# Patient Record
Sex: FEMALE | Race: BLACK OR AFRICAN AMERICAN | NOT HISPANIC OR LATINO | Employment: FULL TIME | URBAN - METROPOLITAN AREA
[De-identification: names, ages, dates, MRNs, and addresses within clinical notes are randomized per-mention and may not be internally consistent; named-entity substitution may affect disease eponyms.]

---

## 2017-03-15 ENCOUNTER — HOSPITAL ENCOUNTER (EMERGENCY)
Facility: HOSPITAL | Age: 37
Discharge: HOME | End: 2017-03-15
Attending: EMERGENCY MEDICINE
Payer: OTHER MISCELLANEOUS

## 2017-03-15 VITALS
OXYGEN SATURATION: 98 % | BODY MASS INDEX: 39.39 KG/M2 | HEART RATE: 66 BPM | TEMPERATURE: 99 F | WEIGHT: 251 LBS | DIASTOLIC BLOOD PRESSURE: 65 MMHG | HEIGHT: 67 IN | SYSTOLIC BLOOD PRESSURE: 149 MMHG | RESPIRATION RATE: 20 BRPM

## 2017-03-15 DIAGNOSIS — R51.9 NONINTRACTABLE HEADACHE, UNSPECIFIED CHRONICITY PATTERN, UNSPECIFIED HEADACHE TYPE: Primary | ICD-10-CM

## 2017-03-15 DIAGNOSIS — S09.90XA CLOSED HEAD INJURY, INITIAL ENCOUNTER: ICD-10-CM

## 2017-03-15 PROCEDURE — 99283 EMERGENCY DEPT VISIT LOW MDM: CPT

## 2017-03-15 NOTE — ED TRIAGE NOTES
Pt presents to ED with c/o left side head pain that occurred after trying to throw away an 6 ft mirror into an outside dumpster while at work. Pt describes the pain as throbbing and shooting. Incident was on Monday. Ibuprofen OTC was taken when pain was unbearable with relief. Reported mild swelling on Monday, denies LOC.

## 2017-03-15 NOTE — ED AVS SNAPSHOT
[facility]  [address] [address]               Rob Hernández   3/15/2017  2:35 PM   ED    Description:  Female : 1980   Department:  [facility]           Your Care was Coordinated By:     Provider Role From To    Ravi Anderson MD Attending Provider 03/15/17 9219 --      Reason for Visit     Headache           Diagnoses this Visit        Comments    Nonintractable headache, unspecified chronicity pattern, unspecified headache type    -  Primary     Closed head injury, initial encounter           ED Disposition     None           To Do List           Follow-up Information     Follow up with Courtney Osuna MD. Schedule an appointment as soon as possible for a visit in 1 week.    Specialty:  Internal Medicine    Contact information:    [address] [facility]  [address]  [facility] [address]  [phone]          Schedule an appointment as soon as possible for a visit with Fernandez Herman III, MD.    Specialty:  Neurology    Why:  If symptoms worsen    Contact information:    [address]  [address]  [facility] [address]  [phone]          Follow up with [facility].    Specialty:  Emergency Medicine    Why:  If symptoms worsen    Contact information:    [address] [place] [address] [phone]      [facility]     [facility] Care [facility] - 24/7 Assistance  Registered nurses in the [facility] provide clinical advisement, health education, appointment booking, and other advisory services.  Call for this free service at [phone].             Medications           Message regarding Medications     Verify the changes and/or additions to your medication regime listed below are the same as discussed with your clinician today.  If any of these changes or additions are incorrect, please notify your healthcare provider.             Verify that the below list of  "medications is an accurate representation of the medications you are currently taking.  If none reported, the list may be blank. If incorrect, please contact your healthcare provider. Carry this list with you in case of emergency.           Current Medications     hydrocodone-acetaminophen 5-325mg (NORCO) 5-325 mg per tablet Take 1 tablet by mouth every 6 (six) hours as needed for Pain.    ibuprofen (ADVIL,MOTRIN) 800 MG tablet Take 1 tablet (800 mg total) by mouth 3 (three) times daily.           Clinical Reference Information           Your Vitals Were     BP Pulse Temp Resp Height Weight    149/65 (Patient Position: Sitting) 66 98.6 °F (37 °C) (Oral) 20 5' 7" (1.702 m) 113.9 kg (251 lb)    Last Period SpO2 BMI          03/15/2017 (Exact Date) 98% 39.31 kg/m2        Allergies as of 3/15/2017     No Known Allergies      Immunizations Administered on Date of Encounter - 3/15/2017     None      ED Micro, Lab, POCT     Start Ordered       Status Ordering Provider    03/15/17 1411 03/15/17 1410  POCT urine pregnancy  Once      Ordered       ED Imaging Orders     None      Discharge References/Attachments     CONCUSSION, DISCHARGE INSTRUCTIONS FOR (ENGLISH)    HEAD INJURY (ADULT) (ENGLISH)       [facility] complies with applicable Federal civil rights laws and does not discriminate on the basis of race, color, national origin, age, disability, or sex.        Language Assistance Services     ATTENTION: Language assistance services are available, free of charge. Please call [phone].      ATENCIÓN: Si habla español, tiene a ya disposición servicios gratuitos de asistencia lingüística. Llame al [phone].     CHÚ Ý: N?u b?n nói Ti?ng Vi?t, có các d?ch v? h? tr? ngôn ng? mi?n phí dành cho b?n. G?i s? [phone].        "

## 2017-09-05 NOTE — ED PROVIDER NOTES
"Encounter Date: 3/15/2017       History     Chief Complaint   Patient presents with    Headache     headaches; "large mirror hit me in head last week at work;" intermittent sharp pain to left side of forehead where mirror hit her     Review of patient's allergies indicates:  No Known Allergies  HPI Comments: Patient presents complaining of intermittent headaches ever since a closed head injury last week.  Patient reports she was putting away a large near when it fell backwards and struck her on the left side of her head.  No loss of consciousness.  No nausea or vomiting.  Has had intermittent headache since then.  Usually resolve with ibuprofen.  Some photophobia.  No photophobia.  No dizziness lightheadedness.  No difficulty talking.  No difficulty walking.  Never any bleeding or bruising.  Denies any other complaints.  Denies any blood thinning medication.    The history is provided by the patient.     History reviewed. No pertinent past medical history.  Past Surgical History:   Procedure Laterality Date    INNER EAR SURGERY      INNER EAR SURGERY      left knee surg      right shoulder surg      TUBAL LIGATION      VAGINAL DELIVERY      x3     Family History   Problem Relation Age of Onset    Hypertension Mother      Social History   Substance Use Topics    Smoking status: Never Smoker    Smokeless tobacco: Never Used    Alcohol use No     Review of Systems   Constitutional: Negative for activity change, appetite change, chills, fatigue and fever.   HENT: Negative for congestion, drooling, ear pain, postnasal drip, rhinorrhea, sinus pressure, sneezing and sore throat.    Eyes: Positive for photophobia. Negative for discharge and redness.   Respiratory: Negative for cough, chest tightness, shortness of breath and wheezing.    Cardiovascular: Negative for chest pain.   Gastrointestinal: Negative for abdominal pain, diarrhea, nausea and rectal pain.   Endocrine: Negative for polydipsia, polyphagia and " polyuria.   Genitourinary: Negative for decreased urine volume, dysuria, flank pain, pelvic pain and urgency.   Musculoskeletal: Negative for back pain, myalgias and neck pain.   Skin: Negative for rash.   Neurological: Positive for headaches. Negative for dizziness, tremors, syncope, facial asymmetry, speech difficulty, weakness and numbness.   Hematological: Does not bruise/bleed easily.   Psychiatric/Behavioral: Negative for confusion and dysphoric mood.   All other systems reviewed and are negative.      Physical Exam   Initial Vitals   BP Pulse Resp Temp SpO2   03/15/17 1407 03/15/17 1407 03/15/17 1407 03/15/17 1407 03/15/17 1407   149/65 66 20 98.6 °F (37 °C) 98 %     Physical Exam    Nursing note and vitals reviewed.  Constitutional: She appears well-developed and well-nourished. She is not diaphoretic. No distress.   HENT:   Head: Normocephalic and atraumatic.   Right Ear: External ear normal.   Left Ear: External ear normal.   Nose: Nose normal.   Mouth/Throat: Oropharynx is clear and moist.   Eyes: Conjunctivae and EOM are normal. Pupils are equal, round, and reactive to light. Right eye exhibits no discharge. Left eye exhibits no discharge. No scleral icterus.   Neck: Normal range of motion. Neck supple.   Cardiovascular: Normal rate, regular rhythm, normal heart sounds and intact distal pulses.   No murmur heard.  Pulmonary/Chest: Breath sounds normal. No respiratory distress. She has no wheezes. She has no rhonchi. She has no rales. She exhibits no tenderness.   Abdominal: Soft. Bowel sounds are normal. She exhibits no distension. There is no tenderness. There is no rebound and no guarding.   Musculoskeletal: Normal range of motion. She exhibits no edema or tenderness.   Neurological: She is alert and oriented to person, place, and time. She has normal strength.   Cranial nerves II through XII grossly intact.  5/5 motor strength all 4 extremities.  Sensation is normal.  Finger to nose normal.  Gait  normal.  Speech and cognition is normal.  No focal neurologic deficit.     Skin: Skin is warm and dry.   Psychiatric: She has a normal mood and affect. Her behavior is normal. Judgment and thought content normal.         ED Course   Procedures  Labs Reviewed   POCT URINE PREGNANCY             Medical Decision Making:   ED Management:  Well-appearing patient with intermittent headaches ever since a closed head injury last week.  No concerning findings physical examination.  Completely normal neurologic exam.  No signs of skull fracture.  No hemotympanum.  No nausea vomiting.  No indication for neuroimaging.  Discussed closed head injuries and possible concussion.  Signed return to work form.    I did have an extensive talk regarding signs to return for and need for follow up. Patient expressed understanding and will monitor symptoms closely and follow-up as needed.    LAUREL Anderson M.D.  03/15/2017  2:48 PM                     ED Course     Clinical Impression:   The primary encounter diagnosis was Nonintractable headache, unspecified chronicity pattern, unspecified headache type. A diagnosis of Closed head injury, initial encounter was also pertinent to this visit.          Ravi Anderson MD  03/15/17 1381     ACS ATTENDING AND PA family at bedside. Justo AMAYA

## 2017-09-23 ENCOUNTER — HOSPITAL ENCOUNTER (EMERGENCY)
Facility: HOSPITAL | Age: 37
Discharge: HOME | End: 2017-09-23
Attending: EMERGENCY MEDICINE
Payer: MEDICAID

## 2017-09-23 VITALS
RESPIRATION RATE: 16 BRPM | WEIGHT: 237 LBS | SYSTOLIC BLOOD PRESSURE: 126 MMHG | HEIGHT: 67 IN | OXYGEN SATURATION: 100 % | HEART RATE: 66 BPM | BODY MASS INDEX: 37.2 KG/M2 | TEMPERATURE: 99 F | DIASTOLIC BLOOD PRESSURE: 72 MMHG

## 2017-09-23 DIAGNOSIS — E86.0 DEHYDRATION: ICD-10-CM

## 2017-09-23 DIAGNOSIS — K52.9 GASTROENTERITIS: Primary | ICD-10-CM

## 2017-09-23 LAB
ALBUMIN SERPL BCP-MCNC: 3.8 G/DL
ALP SERPL-CCNC: 73 U/L
ALT SERPL W/O P-5'-P-CCNC: 19 U/L
ANION GAP SERPL CALC-SCNC: 9 MMOL/L
AST SERPL-CCNC: 23 U/L
B-HCG UR QL: NEGATIVE
BACTERIA #/AREA URNS HPF: ABNORMAL /HPF
BASOPHILS # BLD AUTO: 0.01 K/UL
BASOPHILS NFR BLD: 0.1 %
BILIRUB SERPL-MCNC: 0.6 MG/DL
BILIRUB UR QL STRIP: NEGATIVE
BUN SERPL-MCNC: 9 MG/DL
CALCIUM SERPL-MCNC: 9.2 MG/DL
CHLORIDE SERPL-SCNC: 105 MMOL/L
CLARITY UR: CLEAR
CO2 SERPL-SCNC: 25 MMOL/L
COLOR UR: YELLOW
CREAT SERPL-MCNC: 0.8 MG/DL
CTP QC/QA: YES
DIFFERENTIAL METHOD: ABNORMAL
EOSINOPHIL # BLD AUTO: 0 K/UL
EOSINOPHIL NFR BLD: 0 %
ERYTHROCYTE [DISTWIDTH] IN BLOOD BY AUTOMATED COUNT: 13.8 %
EST. GFR  (AFRICAN AMERICAN): >60 ML/MIN/1.73 M^2
EST. GFR  (NON AFRICAN AMERICAN): >60 ML/MIN/1.73 M^2
GLUCOSE SERPL-MCNC: 110 MG/DL
GLUCOSE UR QL STRIP: NEGATIVE
HCT VFR BLD AUTO: 39.2 %
HGB BLD-MCNC: 13 G/DL
HGB UR QL STRIP: ABNORMAL
KETONES UR QL STRIP: NEGATIVE
LEUKOCYTE ESTERASE UR QL STRIP: NEGATIVE
LYMPHOCYTES # BLD AUTO: 0.5 K/UL
LYMPHOCYTES NFR BLD: 6.3 %
MCH RBC QN AUTO: 28.9 PG
MCHC RBC AUTO-ENTMCNC: 33.2 G/DL
MCV RBC AUTO: 87 FL
MICROSCOPIC COMMENT: ABNORMAL
MONOCYTES # BLD AUTO: 0.2 K/UL
MONOCYTES NFR BLD: 2.7 %
NEUTROPHILS # BLD AUTO: 7.8 K/UL
NEUTROPHILS NFR BLD: 90.9 %
NITRITE UR QL STRIP: NEGATIVE
PH UR STRIP: 5 [PH] (ref 5–8)
PLATELET # BLD AUTO: 231 K/UL
PMV BLD AUTO: 10 FL
POTASSIUM SERPL-SCNC: 3.7 MMOL/L
PROT SERPL-MCNC: 7.5 G/DL
PROT UR QL STRIP: NEGATIVE
RBC # BLD AUTO: 4.5 M/UL
RBC #/AREA URNS HPF: 12 /HPF (ref 0–4)
SODIUM SERPL-SCNC: 139 MMOL/L
SP GR UR STRIP: 1.02 (ref 1–1.03)
SQUAMOUS #/AREA URNS HPF: 3 /HPF
URN SPEC COLLECT METH UR: ABNORMAL
UROBILINOGEN UR STRIP-ACNC: NEGATIVE EU/DL
WBC # BLD AUTO: 8.57 K/UL

## 2017-09-23 PROCEDURE — 85025 COMPLETE CBC W/AUTO DIFF WBC: CPT

## 2017-09-23 PROCEDURE — 63600175 PHARM REV CODE 636 W HCPCS: Performed by: NURSE PRACTITIONER

## 2017-09-23 PROCEDURE — 25000003 PHARM REV CODE 250: Performed by: NURSE PRACTITIONER

## 2017-09-23 PROCEDURE — 96374 THER/PROPH/DIAG INJ IV PUSH: CPT

## 2017-09-23 PROCEDURE — 80053 COMPREHEN METABOLIC PANEL: CPT

## 2017-09-23 PROCEDURE — 81000 URINALYSIS NONAUTO W/SCOPE: CPT

## 2017-09-23 PROCEDURE — 81025 URINE PREGNANCY TEST: CPT

## 2017-09-23 PROCEDURE — 96361 HYDRATE IV INFUSION ADD-ON: CPT

## 2017-09-23 PROCEDURE — 99283 EMERGENCY DEPT VISIT LOW MDM: CPT | Mod: 25

## 2017-09-23 RX ORDER — DICYCLOMINE HYDROCHLORIDE 10 MG/1
20 CAPSULE ORAL
Status: COMPLETED | OUTPATIENT
Start: 2017-09-23 | End: 2017-09-23

## 2017-09-23 RX ORDER — ONDANSETRON 2 MG/ML
4 INJECTION INTRAMUSCULAR; INTRAVENOUS
Status: COMPLETED | OUTPATIENT
Start: 2017-09-23 | End: 2017-09-23

## 2017-09-23 RX ORDER — ONDANSETRON 4 MG/1
4 TABLET, ORALLY DISINTEGRATING ORAL EVERY 8 HOURS PRN
Qty: 12 TABLET | Refills: 0 | Status: SHIPPED | OUTPATIENT
Start: 2017-09-23 | End: 2023-11-01 | Stop reason: CLARIF

## 2017-09-23 RX ORDER — DICYCLOMINE HYDROCHLORIDE 20 MG/1
20 TABLET ORAL 2 TIMES DAILY
Qty: 20 TABLET | Refills: 0 | Status: SHIPPED | OUTPATIENT
Start: 2017-09-23 | End: 2017-10-23

## 2017-09-23 RX ADMIN — DICYCLOMINE HYDROCHLORIDE 20 MG: 10 CAPSULE ORAL at 09:09

## 2017-09-23 RX ADMIN — ONDANSETRON 4 MG: 2 INJECTION INTRAMUSCULAR; INTRAVENOUS at 09:09

## 2017-09-23 RX ADMIN — SODIUM CHLORIDE 1000 ML: 0.9 INJECTION, SOLUTION INTRAVENOUS at 09:09

## 2017-09-24 NOTE — ED TRIAGE NOTES
Patient c/o vomiting, bodyaches, chills and diarrhea since this morning. Patient states she has abdominal pain. Patient denies fever, vaginal discharge and dysuria.

## 2017-09-24 NOTE — ED PROVIDER NOTES
Encounter Date: 9/23/2017    SCRIBE #1 NOTE: I, Kymberly Simon, am scribing for, and in the presence of,  Leonie Johnson NP. I have scribed the following portions of the note - Other sections scribed: HPI and ROS.       History     Chief Complaint   Patient presents with    Vomiting     Patient states that she woke up this morning vomiting and that she has not stopped since.     Abdominal Pain     CC: Vomiting and Abdominal Pain    HPI: The pt is a 37 y.o. F with a PMHx of torn meniscus and torn ligament who presents to the ED c/o nausea with vomiting, sore throat, and acute onset, constant abdominal pain that started this morning. Pt rates pain as severe (8/10). Pt says that she ate a salad yesterday and thinks that it might have caused symptoms. Pt says that she experienced 2 episodes of diarrhea today. No attempted treatments. No alleviating factors. Pt otherwise denies fever, dysuria, and other associated symptoms.       The history is provided by the patient. No  was used.     Review of patient's allergies indicates:  No Known Allergies  Past Medical History:   Diagnosis Date    Torn ligament     left shoulder    Torn meniscus      Past Surgical History:   Procedure Laterality Date    INNER EAR SURGERY      INNER EAR SURGERY      left knee surg      right shoulder surg      TUBAL LIGATION      VAGINAL DELIVERY      x3     Family History   Problem Relation Age of Onset    Hypertension Mother      Social History   Substance Use Topics    Smoking status: Never Smoker    Smokeless tobacco: Never Used    Alcohol use Yes      Comment: occ     Review of Systems   Constitutional: Negative for chills, diaphoresis and fever.   HENT: Positive for sore throat. Negative for ear pain.    Eyes: Negative for redness.   Respiratory: Negative for cough and shortness of breath.    Cardiovascular: Negative for chest pain.   Gastrointestinal: Positive for abdominal pain, diarrhea, nausea and vomiting.    Genitourinary: Negative for dysuria.   Musculoskeletal: Negative for back pain.   Skin: Negative for rash.   Neurological: Negative for headaches.       Physical Exam     Initial Vitals [09/23/17 1940]   BP Pulse Resp Temp SpO2   130/62 65 16 98.9 °F (37.2 °C) 98 %      MAP       84.67         Physical Exam    Nursing note and vitals reviewed.  Constitutional: She appears well-developed and well-nourished.   HENT:   Head: Normocephalic.   Somewhat dry mucous membranes.   Eyes: Conjunctivae are normal.   Neck: Normal range of motion. Neck supple.   Cardiovascular: Normal rate, regular rhythm and normal heart sounds.   Pulmonary/Chest: Breath sounds normal.   Abdominal: Soft. There is no tenderness.   Hyperactive bowel sounds.   Musculoskeletal: Normal range of motion.   Neurological: She is alert and oriented to person, place, and time.   Skin: Skin is warm and dry. Capillary refill takes less than 2 seconds.         ED Course   Procedures  Labs Reviewed   URINALYSIS - Abnormal; Notable for the following:        Result Value    Occult Blood UA 3+ (*)     All other components within normal limits   URINALYSIS MICROSCOPIC - Abnormal; Notable for the following:     RBC, UA 12 (*)     All other components within normal limits   CBC W/ AUTO DIFFERENTIAL - Abnormal; Notable for the following:     Gran # 7.8 (*)     Lymph # 0.5 (*)     Mono # 0.2 (*)     Gran% 90.9 (*)     Lymph% 6.3 (*)     Mono% 2.7 (*)     All other components within normal limits   COMPREHENSIVE METABOLIC PANEL   POCT URINE PREGNANCY             Medical Decision Making:   Differential Diagnosis:   Gastroenteritis  Dehydration  Appendicitis  ED Management:  Diagnosis management comments: This is an urgent evaluation of a 37-year-old female that presented to the ER with c/o vomiting, diarrhea, and abdominal pain. Pts exam was as above.     Labs were reviewed and discussed with pt.     Liter of normal saline given along with Zofran and Bentyl.  Patient  states feeling much better after fluids and she will be discharged with Bentyl and Zofran prescriptions.  We've discussed aggressive hydration and symptomatic treatment at home.    Based on exam today - I have low suspicion for medical, surgical or other life threatening condition and I believe pt is safe for discharge and outpatient f/u.    Pt verbalizes understanding of d/c instructions and will return for worsening condition.    Case discussed with attending who agrees with assessment and plan.               Scribe Attestation:   Scribe #1: I performed the above scribed service and the documentation accurately describes the services I performed. I attest to the accuracy of the note.    Attending Attestation:           Physician Attestation for Scribe:  Physician Attestation Statement for Scribe #1: I, Leonie Johnson NP, reviewed documentation, as scribed by Kymberly Simon in my presence, and it is both accurate and complete.                 ED Course      Clinical Impression:   The primary encounter diagnosis was Gastroenteritis. A diagnosis of Dehydration was also pertinent to this visit.    Disposition:   Disposition: Discharged  Condition: Stable                        Leonie Johnson NP  09/24/17 0254

## 2018-03-10 ENCOUNTER — HOSPITAL ENCOUNTER (EMERGENCY)
Facility: HOSPITAL | Age: 38
Discharge: HOME | End: 2018-03-10
Attending: EMERGENCY MEDICINE
Payer: MEDICAID

## 2018-03-10 VITALS
BODY MASS INDEX: 33.9 KG/M2 | HEIGHT: 67 IN | HEART RATE: 68 BPM | RESPIRATION RATE: 16 BRPM | WEIGHT: 216 LBS | SYSTOLIC BLOOD PRESSURE: 122 MMHG | DIASTOLIC BLOOD PRESSURE: 57 MMHG | TEMPERATURE: 98 F | OXYGEN SATURATION: 98 %

## 2018-03-10 DIAGNOSIS — M79.605 LEG PAIN, ANTERIOR, LEFT: Primary | ICD-10-CM

## 2018-03-10 PROCEDURE — 99283 EMERGENCY DEPT VISIT LOW MDM: CPT

## 2018-03-10 RX ORDER — IBUPROFEN 800 MG/1
800 TABLET ORAL EVERY 8 HOURS PRN
Qty: 20 TABLET | Refills: 0 | Status: SHIPPED | OUTPATIENT
Start: 2018-03-10 | End: 2023-11-01 | Stop reason: CLARIF

## 2018-03-10 NOTE — ED PROVIDER NOTES
Encounter Date: 3/10/2018    SCRIBE #1 NOTE: I, Ronna Ricci, am scribing for, and in the presence of,  Nina Magana MD. I have scribed the following portions of the note - Other sections scribed: HPI, ROS, and PE.       History     Chief Complaint   Patient presents with    Leg Pain     Pt report after standing up 3 days ago got a sharp pain in left great toe radiating up to hip area, denies injury, pain is getting worse.      CC: Leg Pain    HPI: This 37 y.o female presents to the ED for an evaluation of acute onset, constant pain to her left anterior lower leg radiating to the dorsum of her left foot for the past 3 days.  Patient reports the pain initially began after getting up from a seated position after sitting in a small chair approximately 1 foot off the ground.  Patient denies any recent falls, trauma, or injuries.  Patient reports she initially assumed the pain was a result of her prior h/o a torn meniscus to her left knee or due to the impact of her running during her workouts. Patient reports since she has been wearing a knee brace.  Patient reports attempting treatment with IcyHot, Naproxen, and massaging the area.  She currently reports of no pain at this time.  Patient denies fever, chills, extremity numbness, extremity weakness, rash, hip pain, redness, swelling, calf pain, or any other associated symptoms.        The history is provided by the patient. No  was used.     Review of patient's allergies indicates:  No Known Allergies  Past Medical History:   Diagnosis Date    Torn ligament     left shoulder    Torn meniscus      Past Surgical History:   Procedure Laterality Date    INNER EAR SURGERY      INNER EAR SURGERY      left knee surg      right shoulder surg      TUBAL LIGATION      VAGINAL DELIVERY      x3     Family History   Problem Relation Age of Onset    Hypertension Mother      Social History   Substance Use Topics    Smoking status: Never Smoker     Smokeless tobacco: Never Used    Alcohol use Yes      Comment: occ     Review of Systems   Constitutional: Negative for chills and fever.   HENT: Negative for ear pain and sore throat.    Eyes: Negative for pain.   Respiratory: Negative for cough and shortness of breath.    Cardiovascular: Negative for chest pain.   Gastrointestinal: Negative for abdominal pain, diarrhea, nausea and vomiting.   Genitourinary: Negative for dysuria.   Musculoskeletal: Positive for myalgias. Negative for back pain.        Left anterior leg pain.   Skin: Negative for rash.   Neurological: Negative for weakness, numbness and headaches.   All other systems reviewed and are negative.      Physical Exam     Initial Vitals [03/10/18 0258]   BP Pulse Resp Temp SpO2   112/63 81 16 98.1 °F (36.7 °C) 96 %      MAP       79.33         Physical Exam    Nursing note and vitals reviewed.  Constitutional: She appears well-developed and well-nourished. She is not diaphoretic. No distress.   HENT:   Head: Normocephalic and atraumatic.   Eyes: Conjunctivae and EOM are normal. Pupils are equal, round, and reactive to light.   Neck: Normal range of motion. Neck supple.   Cardiovascular: Normal rate and regular rhythm.   Pulses:       Dorsalis pedis pulses are 2+ on the left side.        Posterior tibial pulses are 2+ on the left side.   Pulmonary/Chest: Breath sounds normal. No respiratory distress. She has no wheezes. She has no rhonchi. She has no rales. She exhibits no tenderness.   Abdominal: Soft. Normal appearance and bowel sounds are normal. She exhibits no distension. There is no tenderness. There is no rebound and no guarding.   Musculoskeletal: Normal range of motion. She exhibits no edema.   Patient has no tenderness with palpation to the left posterior knee or left calf.   Neurological: She is alert and oriented to person, place, and time. She has normal strength and normal reflexes.   Skin: Skin is warm and dry. Capillary refill takes less  than 2 seconds. No rash noted. No erythema.   Psychiatric: She has a normal mood and affect.         ED Course   Procedures  Labs Reviewed - No data to display          Medical Decision Making:   History:   Old Medical Records: I decided to obtain old medical records.  Initial Assessment:   37 y.o female presents to the ED for an evaluation of acute onset, constant pain to her left anterior lower leg radiating to the dorsum of her left foot for the past 3 days.  Patient reports the pain initially began after getting up from a seated position after sitting in a small chair approximately 1 foot off the ground.  Patient denies any recent falls, trauma, or injuries. On exam, the patient has normal ROM without pain.  There is no evidence of swelling, warmth, or erythema.  There is no calf or posterior knee tenderness.  Patient has a normal dorsalis pedis pulse.  Differential Diagnosis:   Leg Pain, Leg Strain            Scribe Attestation:   Scribe #1: I performed the above scribed service and the documentation accurately describes the services I performed. I attest to the accuracy of the note.    Attending Attestation:           Physician Attestation for Scribe:  Physician Attestation Statement for Scribe #1: I, Nina Magana MD, reviewed documentation, as scribed by Ronna Ricci in my presence, and it is both accurate and complete.     Comments: I, Dr. Magana, personally performed the services described in this documentation. All medical record entries made by the scribe were at my direction and in my presence.  I have reviewed the chart and agree that the record reflects my personal performance and is accurate and complete.                 Clinical Impression:   The encounter diagnosis was Leg pain, anterior, left.    Disposition:   Disposition: Discharged  Condition: Stable                        Nina Magana MD  03/10/18 0546

## 2018-03-10 NOTE — ED TRIAGE NOTES
"Pt c/o left hip pain radiating down to toes x3 days. Described as "needle like"  with numbness and tingling. Pt states that pain currently has subsided since. Denies injuries or trauma. Pt states p[ain began today when patient was stretching. Pt states her left legs has started to swell the past 3 days. Pt states pain was "shooting" behind calf area   "

## 2018-06-11 ENCOUNTER — OFFICE VISIT (OUTPATIENT)
Dept: OBSTETRICS AND GYNECOLOGY | Facility: CLINIC | Age: 38
End: 2018-06-11
Payer: MEDICAID

## 2018-06-11 VITALS
SYSTOLIC BLOOD PRESSURE: 116 MMHG | WEIGHT: 202 LBS | BODY MASS INDEX: 31.71 KG/M2 | HEIGHT: 67 IN | DIASTOLIC BLOOD PRESSURE: 68 MMHG

## 2018-06-11 DIAGNOSIS — Z11.3 SCREEN FOR STD (SEXUALLY TRANSMITTED DISEASE): ICD-10-CM

## 2018-06-11 DIAGNOSIS — Z01.419 GYNECOLOGIC EXAM NORMAL: Primary | ICD-10-CM

## 2018-06-11 DIAGNOSIS — N89.8 VAGINAL DISCHARGE: ICD-10-CM

## 2018-06-11 PROCEDURE — 99999 PR PBB SHADOW E&M-EST. PATIENT-LVL III: CPT | Mod: PBBFAC,,, | Performed by: OBSTETRICS & GYNECOLOGY

## 2018-06-11 PROCEDURE — 87491 CHLMYD TRACH DNA AMP PROBE: CPT

## 2018-06-11 PROCEDURE — 87480 CANDIDA DNA DIR PROBE: CPT

## 2018-06-11 PROCEDURE — 99213 OFFICE O/P EST LOW 20 MIN: CPT | Mod: PBBFAC | Performed by: OBSTETRICS & GYNECOLOGY

## 2018-06-11 PROCEDURE — 99386 PREV VISIT NEW AGE 40-64: CPT | Mod: S$PBB,,, | Performed by: OBSTETRICS & GYNECOLOGY

## 2018-06-11 PROCEDURE — 87510 GARDNER VAG DNA DIR PROBE: CPT

## 2018-06-11 PROCEDURE — 88175 CYTOPATH C/V AUTO FLUID REDO: CPT

## 2018-06-11 RX ORDER — DEXAMETHASONE 0.75 MG/1
TABLET ORAL
Refills: 1 | COMMUNITY
Start: 2018-03-30 | End: 2023-11-01 | Stop reason: CLARIF

## 2018-06-11 RX ORDER — OMEPRAZOLE 40 MG/1
CAPSULE, DELAYED RELEASE ORAL
Refills: 1 | COMMUNITY
Start: 2018-04-09 | End: 2023-11-01 | Stop reason: CLARIF

## 2018-06-11 NOTE — PROGRESS NOTES
Subjective:       Patient ID: Rob Hernández is a 38 y.o. female.    Chief Complaint:  Gynecologic Exam      History of Present Illness  HPI  Annual Exam-Premenopausal  Patient presents for annual exam. The patient has no complaints today. The patient is sexually active. GYN screening history: last pap: was normal and patient does not recall when last pap was.  Pt just had [facility] at [facility]. The patient wears seatbelts: yes. The patient participates in regular exercise: no. Has the patient ever been transfused or tattooed?: not asked. The patient reports that there is not domestic violence in her life.    Pt c/o vaginal discharge with odor for approx 1 month.                  GYN & OB History  Patient's last menstrual period was 2018.   Date of Last Pap: No result found    OB History    Para Term  AB Living   4 4 4     4   SAB TAB Ectopic Multiple Live Births           4      # Outcome Date GA Lbr Carlitos/2nd Weight Sex Delivery Anes PTL Lv   4 Term 10/12/11 40w0d   F Vag-Spont  N AURY   3 Term 06 40w0d   M Vag-Spont  N AURY   2 Term 04 40w0d    Vag-Spont  N AURY   1 Term 00 40w0d   M Vag-Spont  N AURY          Review of Systems  Review of Systems   Constitutional: Negative.    Respiratory: Negative.    Cardiovascular: Negative.    Gastrointestinal: Negative.    Endocrine: Negative.    Genitourinary: Negative.    Musculoskeletal: Negative.    Hematological: Negative.    Psychiatric/Behavioral: Negative.    Breast: negative.            Objective:    Physical Exam:   Constitutional: She is oriented to person, place, and time. She appears well-developed and well-nourished. No distress.    HENT:   Head: Normocephalic and atraumatic.     Neck: Normal range of motion. No thyromegaly present.    Cardiovascular: Normal rate.     Pulmonary/Chest: Effort normal. No respiratory distress.        Abdominal: Soft. She exhibits no distension and no mass. There is no tenderness. There is no  rebound and no guarding.     Genitourinary: Uterus normal. Pelvic exam was performed with patient supine. There is no rash, tenderness, lesion or injury on the right labia. There is no rash, tenderness, lesion or injury on the left labia. Cervix is normal. Right adnexum displays no mass, no tenderness and no fullness. Left adnexum displays no mass, no tenderness and no fullness. No erythema, tenderness, bleeding, rectocele, cystocele or unspecified prolapse of vaginal walls in the vagina. No foreign body in the vagina. No signs of injury around the vagina. Vaginal discharge found. Labial bartholins normal.          Musculoskeletal: Normal range of motion and moves all extremeties.       Neurological: She is alert and oriented to person, place, and time. No cranial nerve deficit. Coordination normal.    Skin: She is not diaphoretic.    Psychiatric: She has a normal mood and affect. Her behavior is normal. Judgment and thought content normal.          Assessment:        1. Gynecologic exam normal    2. Screen for STD (sexually transmitted disease)    3. Vaginal discharge               Plan:      1.  Pap, GC/CT andAFFIRM collected - treatment based on results  2.  Pt up to date with MMG

## 2018-06-12 LAB
CANDIDA RRNA VAG QL PROBE: NEGATIVE
G VAGINALIS RRNA GENITAL QL PROBE: POSITIVE
T VAGINALIS RRNA GENITAL QL PROBE: NEGATIVE

## 2018-06-13 ENCOUNTER — TELEPHONE (OUTPATIENT)
Dept: OBSTETRICS AND GYNECOLOGY | Facility: HOSPITAL | Age: 38
End: 2018-06-13

## 2018-06-13 DIAGNOSIS — N76.0 BACTERIAL VAGINOSIS: Primary | ICD-10-CM

## 2018-06-13 DIAGNOSIS — B96.89 BACTERIAL VAGINOSIS: Primary | ICD-10-CM

## 2018-06-13 RX ORDER — METRONIDAZOLE 500 MG/1
500 TABLET ORAL EVERY 12 HOURS
Qty: 14 TABLET | Refills: 0 | Status: SHIPPED | OUTPATIENT
Start: 2018-06-13 | End: 2018-06-27

## 2018-06-14 LAB
C TRACH DNA SPEC QL NAA+PROBE: NOT DETECTED
N GONORRHOEA DNA SPEC QL NAA+PROBE: NOT DETECTED

## 2018-06-15 ENCOUNTER — TELEPHONE (OUTPATIENT)
Dept: OBSTETRICS AND GYNECOLOGY | Facility: CLINIC | Age: 38
End: 2018-06-15

## 2018-06-15 NOTE — TELEPHONE ENCOUNTER
Returned patient's call. Requested results of pap. Informed her that pap results are not in yet. Results given for neg. Cultures. But pt does have bv. Aware that prescription has been sent to pharmacy for . Pt acknowledged. CW

## 2018-06-15 NOTE — TELEPHONE ENCOUNTER
----- Message from Sudarshan Guzmán sent at 6/15/2018 12:00 PM CDT -----  Contact: Rob [phone]  Patient is calling to get the results of her last pap smear. Please call at your earliest convenience.

## 2018-07-03 DIAGNOSIS — M25.561 RIGHT KNEE PAIN: Primary | ICD-10-CM

## 2018-07-03 DIAGNOSIS — R22.41 LOCALIZED SWELLING, MASS AND LUMP, LOWER LIMB, RIGHT: ICD-10-CM

## 2018-07-16 ENCOUNTER — HOSPITAL ENCOUNTER (OUTPATIENT)
Dept: RADIOLOGY | Facility: HOSPITAL | Age: 38
Discharge: HOME | End: 2018-07-16
Attending: INTERNAL MEDICINE
Payer: MEDICAID

## 2018-07-16 DIAGNOSIS — M25.561 RIGHT KNEE PAIN: ICD-10-CM

## 2018-07-16 DIAGNOSIS — M25.561 PAIN IN RIGHT KNEE: Primary | ICD-10-CM

## 2018-07-16 PROCEDURE — 73562 X-RAY EXAM OF KNEE 3: CPT | Mod: 26,RT,, | Performed by: RADIOLOGY

## 2018-07-16 PROCEDURE — 73562 X-RAY EXAM OF KNEE 3: CPT | Mod: TC,FY,RT

## 2018-08-03 ENCOUNTER — CLINICAL SUPPORT (OUTPATIENT)
Dept: REHABILITATION | Facility: HOSPITAL | Age: 38
End: 2018-08-03
Payer: MEDICAID

## 2018-08-03 DIAGNOSIS — M62.81 MUSCLE WEAKNESS: ICD-10-CM

## 2018-08-03 DIAGNOSIS — M25.561 RIGHT KNEE PAIN, UNSPECIFIED CHRONICITY: ICD-10-CM

## 2018-08-03 DIAGNOSIS — M25.60 JOINT STIFFNESS: ICD-10-CM

## 2018-08-03 PROCEDURE — 97161 PT EVAL LOW COMPLEX 20 MIN: CPT | Mod: PN

## 2018-08-03 NOTE — PROGRESS NOTES
"  TIME RECORD    Date: 08/03/2018    Start Time:  11:30  Stop Time:  12:30        Total Timed Minutes:  60 minutes    OUTPATIENT PHYSICAL THERAPY   PATIENT EVALUATION  Onset Date: 2 months ago  Primary Diagnosis:   1. Right knee pain, unspecified chronicity     2. Muscle weakness     3. Joint stiffness       Treatment Diagnosis: see above  Past Medical History:   Diagnosis Date    Torn ligament     left shoulder    Torn meniscus      Precautions: none  Prior Therapy: Left knee for torn meniscus, left hip for bursitis, right shoulder for torn ligament  Medications: Rob Hernández has a current medication list which includes the following prescription(s): biotin, dexamethasone, hydrocodone-acetaminophen, ibuprofen, ibuprofen, omeprazole, and ondansetron.  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 She reports she was.teaching her daughter how to ride a bike 2 months ago, and she went to turn and fell over the bike and hit her knee on the bike. She states she typically runs 6-7 times per week, but is now running about 3 times per week. She states she also participates in dancing, yoga, and kickboxing. She reports no c/o pain currently. She states she would like to receive knowledge/tips on continuing her work out routines without discomfort.      Pain:  Location: knee - anterior infrapatellar  Activities Which Increase Pain: Running on different terrains about 6-7 days/week, coldness  Activities Which Decrease Pain: ice  Pain Scale: 0/10 at best 0/10 now  2/10 at worst    Objective     Observation: Slight anterior pelvic tilt  No sign of " abnormalities with functional observation including static squat, yoga poses, running mechanics, lower extremity strengthening exercises pt performs   Palpation: Mild tenderness to right tibial tubercle    Bed Mobility:independent  Transfers: independent      Range of Motion/Strength:   Knee  Right    LEFT       AROM  PROM  MMT  AROM  PROM  MMT    Flexion  135 NT 4/5* 140 NT 4+/5   Extension  -6 NT 4+/5 -4  NT 4+/5     Hip Right Left    MMT MMT   Flexion 4+ 4+   Extension 4+ 4+   Abduction 4+ 4+   Internal rotation 4+ 4+   External rotation 4+ 4+     AROM: Right LE: WFL Left LE: WFL    Special tests:   -MCL: negative  -LCL negative  -Shaka's: negative  -Samir's: negative  -Anterior drawer: negative  -Posterior drawer: negative    Mobility:   Patellar mobilizations: good    Hip flexor length: right>left slight limitation    Treatment:   Pt received therapeutic exercises to develop strength, endurance, ROM, flexibility, posture and core stabilization for 10 minutes including:    -hip flexor stretch kneeling 20 sec x 4    Pt was instructed in and given a home exercise program consisting of the above activities.   Assessment      Pt presents with signs and symptoms consistent with referring diagnosis. Evaluation has determined a slight decrease in functional status and subjective and objective deficits that can be addressed by physical therapy intervention. Pt demonstrates discomfort limiting functional activities occasionally. Decreased segmental motion. Decreased participation in functional and recreational activities. Subjective and objective measures are addressed by goals in the plan of care. Patient/family are involved in the development of these goals. Patient/family are educated about current injury and treatment. Pt demonstrates no additional cultural, spiritual or educational need and currently has no barriers to learning.      Pt responded well to treatment today. Pt is a good candidate for skilled  physical therapy intervention and has a good prognosis and is motivated to return to functional an recreational activities.     Rehab Potential: good    History  Co-morbidities and personal factors that may impact the plan of care Examination  Body Structures and Functions, activity limitations and participation restrictions that may impact the plan of care Clinical Presentation   Decision Making/ Complexity Score   Co-morbidities:   Torn ligament   left shoulder   Torn meniscus           Personal Factors:   Work schedules Body Regions: right knee    Body Systems: musculoskeletal      Activity limitations: prolonged running or walking    Participation Restrictions:  Pt able to participate in work and activities with her knee discomfort at times.   stable   Low         Short Term Goals (4 Weeks):     1.Pt to improve range of motion by 25% to allow for improved functional mobility to allow for improvement in IADLs.   2.Pt to report compliance with HEP and demonstrate proper exercise technique to PT to show competence with self management of condition.  3.Decrease pain by 25% during functional activities.    Long Term Goals (12 Weeks):     1. Increase ROM to allow improved joint biomechanics during functional activities.    2. Independent with home exercise program.   3. Full return to functional activities with manageable complaints.  4. Patient to demonstrate improved posture and body mechanics.  5. Decrease pain by 75% during functional activities.     CMS Impairment/Limitation/Restriction for FOTO Knee Survey  Status Limitation G-Code CMS Severity Modifier  Intake 72% 28% Current Status CJ - At least 20 percent but less than 40 percent  Predicted 74% 26% Goal Status+ CJ - At least 20 percent but less than 40 percent    Plan      Certification Period: 8/3/18 to 11/3/18    Recommended Treatment Plan: 1-2 times per month for 12 weeks with treatments to consist of:  Neuromuscular and postural re-education, body   training, therapeutic exercise, therapeutic activities,balance training, manual therapy, soft tissue mobilization, ROM exercises, Cardiovascular, Postural stabilization, manual traction, spinal mobilization, moist heat, cryotherapy, electrical stimulation, ultrasound, home exercise education and planning.        Therapist: Zak Wayne, PT  Sabrina Camilo SPT

## 2018-08-06 NOTE — PLAN OF CARE
"  TIME RECORD    Date: 08/03/2018    Start Time:  11:30  Stop Time:  12:30        Total Timed Minutes:  60 minutes    OUTPATIENT PHYSICAL THERAPY   PATIENT EVALUATION  Onset Date: 2 months ago  Primary Diagnosis:   1. Right knee pain, unspecified chronicity     2. Muscle weakness     3. Joint stiffness       Treatment Diagnosis: see above  Past Medical History:   Diagnosis Date    Torn ligament     left shoulder    Torn meniscus      Precautions: none  Prior Therapy: Left knee for torn meniscus, left hip for bursitis, right shoulder for torn ligament  Medications: Rob Hernández has a current medication list which includes the following prescription(s): biotin, dexamethasone, hydrocodone-acetaminophen, ibuprofen, ibuprofen, omeprazole, and ondansetron.  Nutrition:  Normal  History of Present Illness: subacute  Prior Level of Function: Independent  Social History: Volunteer at [employer], Sunday , hearing impaired, ,   Patient Therapy Goals: "Know how to continue working out"    Subjective     Rob Hernández is a 38 year old female presenting with c/o some discomfort in her right knee. She reports she was.teaching her daughter how to ride a bike 2 months ago, and she went to turn and fell over the bike and hit her knee on the bike. She states she typically runs 6-7 times per week, but is now running about 3 times per week. She states she also participates in dancing, yoga, and kickboxing. She reports no c/o pain currently. She states she would like to receive knowledge/tips on continuing her work out routines without discomfort.      Pain:  Location: knee - anterior infrapatellar  Activities Which Increase Pain: Running on different terrains about 6-7 days/week, coldness  Activities Which Decrease Pain: ice  Pain Scale: 0/10 at best 0/10 now  2/10 at worst    Objective     Observation: Slight anterior pelvic tilt  No sign of " abnormalities with functional observation including static squat, yoga poses, running mechanics, lower extremity strengthening exercises pt performs   Palpation: Mild tenderness to right tibial tubercle    Bed Mobility:independent  Transfers: independent      Range of Motion/Strength:   Knee  Right    LEFT       AROM  PROM  MMT  AROM  PROM  MMT    Flexion  135 NT 4/5* 140 NT 4+/5   Extension  -6 NT 4+/5 -4  NT 4+/5     Hip Right Left    MMT MMT   Flexion 4+ 4+   Extension 4+ 4+   Abduction 4+ 4+   Internal rotation 4+ 4+   External rotation 4+ 4+     AROM: Right LE: WFL Left LE: WFL    Special tests:   -MCL: negative  -LCL negative  -Shaka's: negative  -Samir's: negative  -Anterior drawer: negative  -Posterior drawer: negative    Mobility:   Patellar mobilizations: good    Hip flexor length: right>left slight limitation    Treatment:   Pt received therapeutic exercises to develop strength, endurance, ROM, flexibility, posture and core stabilization for 10 minutes including:    -hip flexor stretch kneeling 20 sec x 4    Pt was instructed in and given a home exercise program consisting of the above activities.   Assessment      Pt presents with signs and symptoms consistent with referring diagnosis. Evaluation has determined a slight decrease in functional status and subjective and objective deficits that can be addressed by physical therapy intervention. Pt demonstrates discomfort limiting functional activities occasionally. Decreased segmental motion. Decreased participation in functional and recreational activities. Subjective and objective measures are addressed by goals in the plan of care. Patient/family are involved in the development of these goals. Patient/family are educated about current injury and treatment. Pt demonstrates no additional cultural, spiritual or educational need and currently has no barriers to learning.      Pt responded well to treatment today. Pt is a good candidate for skilled  physical therapy intervention and has a good prognosis and is motivated to return to functional an recreational activities.     Rehab Potential: good    History  Co-morbidities and personal factors that may impact the plan of care Examination  Body Structures and Functions, activity limitations and participation restrictions that may impact the plan of care Clinical Presentation   Decision Making/ Complexity Score   Co-morbidities:   Torn ligament   left shoulder   Torn meniscus           Personal Factors:   Work schedules Body Regions: right knee    Body Systems: musculoskeletal      Activity limitations: prolonged running or walking    Participation Restrictions:  Pt able to participate in work and activities with her knee discomfort at times.   stable   Low         Short Term Goals (4 Weeks):     1.Pt to improve range of motion by 25% to allow for improved functional mobility to allow for improvement in IADLs.   2.Pt to report compliance with HEP and demonstrate proper exercise technique to PT to show competence with self management of condition.  3.Decrease pain by 25% during functional activities.    Long Term Goals (12 Weeks):     1. Increase ROM to allow improved joint biomechanics during functional activities.    2. Independent with home exercise program.   3. Full return to functional activities with manageable complaints.  4. Patient to demonstrate improved posture and body mechanics.  5. Decrease pain by 75% during functional activities.     CMS Impairment/Limitation/Restriction for FOTO Knee Survey  Status Limitation G-Code CMS Severity Modifier  Intake 72% 28% Current Status CJ - At least 20 percent but less than 40 percent  Predicted 74% 26% Goal Status+ CJ - At least 20 percent but less than 40 percent    Plan      Certification Period: 8/3/18 to 11/3/18    Recommended Treatment Plan: 1-2 times per month for 12 weeks with treatments to consist of:  Neuromuscular and postural re-education, body   training, therapeutic exercise, therapeutic activities,balance training, manual therapy, soft tissue mobilization, ROM exercises, Cardiovascular, Postural stabilization, manual traction, spinal mobilization, moist heat, cryotherapy, electrical stimulation, ultrasound, home exercise education and planning.        Therapist: Zak Wayne, PT  Sabrina Camilo SPT

## 2018-08-22 ENCOUNTER — CLINICAL SUPPORT (OUTPATIENT)
Dept: REHABILITATION | Facility: HOSPITAL | Age: 38
End: 2018-08-22
Payer: MEDICAID

## 2018-08-22 DIAGNOSIS — M25.561 RIGHT KNEE PAIN, UNSPECIFIED CHRONICITY: Primary | ICD-10-CM

## 2018-08-22 DIAGNOSIS — M25.60 JOINT STIFFNESS: ICD-10-CM

## 2018-08-22 DIAGNOSIS — M62.81 MUSCLE WEAKNESS: ICD-10-CM

## 2018-08-22 PROCEDURE — 97110 THERAPEUTIC EXERCISES: CPT | Mod: PN

## 2018-08-22 NOTE — PROGRESS NOTES
Name: Rob Lewis [facility] Number: [id number]  Date of Treatment: 08/22/2018   Diagnosis:   Encounter Diagnoses   Name Primary?    Right knee pain, unspecified chronicity Yes    Muscle weakness     Joint stiffness        Time in: 4:30  Time Out: 5:00    Total Treatment Time: 30 minutes      Subjective:    Rob reports no change in symptoms. She reports her knee is slightly more irritated than initial evaluation due to running on it a lot this week.  Patient reports their pain to be 0/10 on a 0-10 scale with 0 being no pain and 10 being the worst pain imaginable.      Objective       Treatment:   Pt received therapeutic exercises to develop strength, endurance, ROM, flexibility, posture and core stabilization for 25 minutes including:    -hip flexor stretch kneeling 20 sec x 4 with sh flex/sidebending 10x  -Hip hikes 2 x 10  -Quadruped alt arms/legs 2 x 10  -SL bridges 10x  -Bridge with HS curl on swiss ball 2 x 10  -Bridge w/ hip abduction RTB 10x    Written Home Exercises Provided: Reviewed HEP from initial evaluation.  Pt demo good understanding of the education provided. Rob demonstrated good return demonstration of activities.     Assessment      Pt responded well to treatment today. No sign of abnormalities with functional observation including static squat, yoga poses, running mechanics, lower extremity strengthening exercises pt performs. Pt performing glute strengthening exercises       Short Term Goals (4 Weeks):     1.Pt to improve range of motion by 25% to allow for improved functional mobility to allow for improvement in IADLs.   2.Pt to report compliance with HEP and demonstrate proper exercise technique to PT to show competence with self management of condition.  3.Decrease pain by 25% during functional activities.    Long Term Goals (12 Weeks):     1. Increase ROM to allow improved joint biomechanics during functional activities.    2. Independent with home exercise program.    3. Full return to functional activities with manageable complaints.  4. Patient to demonstrate improved posture and body mechanics.  5. Decrease pain by 75% during functional activities.     CMS Impairment/Limitation/Restriction for FOTO Knee Survey  Status Limitation G-Code CMS Severity Modifier  Intake 72% 28% Current Status CJ - At least 20 percent but less than 40 percent  Predicted 74% 26% Goal Status+ CJ - At least 20 percent but less than 40 percent    Plan      Continue with established Plan of Care towards PT goals. Will d/c next visit pending pt presentation.     Certification Period: 8/3/18 to 11/3/18    Recommended Treatment Plan: 1-2 times per month for 12 weeks with treatments to consist of:  Neuromuscular and postural re-education,  training, therapeutic exercise, therapeutic activities,balance training, manual therapy, soft tissue mobilization, ROM exercises, Cardiovascular, Postural stabilization, manual traction, spinal mobilization, moist heat, cryotherapy, electrical stimulation, ultrasound, home exercise education and planning.    Therapist: Zak Wayne, PT  Sabrina Camilo SPT

## 2019-02-01 DIAGNOSIS — M79.674 PAIN IN TOE OF RIGHT FOOT: Primary | ICD-10-CM

## 2019-02-01 DIAGNOSIS — M79.644 PAIN IN FINGER OF RIGHT HAND: ICD-10-CM

## 2019-02-04 DIAGNOSIS — M54.2 NECK PAIN: Primary | ICD-10-CM

## 2019-02-18 ENCOUNTER — CLINICAL SUPPORT (OUTPATIENT)
Dept: REHABILITATION | Facility: HOSPITAL | Age: 39
End: 2019-02-18
Attending: INTERNAL MEDICINE
Payer: MEDICAID

## 2019-02-18 DIAGNOSIS — R29.3 BAD POSTURE: ICD-10-CM

## 2019-02-18 DIAGNOSIS — M54.2 NECK PAIN ON RIGHT SIDE: ICD-10-CM

## 2019-02-18 PROCEDURE — 97161 PT EVAL LOW COMPLEX 20 MIN: CPT | Mod: PN

## 2019-02-18 NOTE — PLAN OF CARE
Physical Therapy Initial Evaluation     Name: Rob Hernández  Clinic Number: [id number]    Therapy Diagnosis:   Encounter Diagnoses   Name Primary?    Neck pain on right side     Bad posture      Physician: Federico Pina*    Physician Orders: PT Eval and Treat   Medical Diagnosis from Referral: M54.2 (ICD-10-CM) - Neck pain  Evaluation Date: 2/18/2019  Authorization Period Expiration: 2/28/2019  Plan of Care Expiration: 5/18/2019  Visit # / Visits authorized: 1/ 5    Time In: 1510 (Pt was late to appt)  Time Out:1555  Total Billable Time: 45 minutes    Precautions: Standard    Subjective     Medical History:   Past Medical History:   Diagnosis Date    Torn ligament     left shoulder    Torn meniscus        Surgical History:   Rob Hernández  has a past surgical history that includes left knee surg; right shoulder surg; Inner ear surgery; Vaginal delivery; Tubal ligation; and Inner ear surgery.    Medications:   Rob has a current medication list which includes the following prescription(s): biotin, dexamethasone, hydrocodone-acetaminophen, ibuprofen, ibuprofen, omeprazole, and ondansetron.    Allergies:   Review of patient's allergies indicates:  No Known Allergies     Date of onset: Chronic  History of current condition - Rob reports: R sided neck pain and tightness for the past several months.  States she experiences paresthesia in R hand occasionally, but maybe related to her PMH of R CTS.  PMH also of R shoulder surgery in 2007.  Aggravating factors include turning her head, carrying a purse, performing her regular exercise program, sleeping, performing work duties as a .      Imaging, none:     Prior Therapy: None  Social History: lives with their family  Occupation: Hairdresser  Prior Level of Function: Independent, not limited  DME owned/used: None  Current Level of Function: Difficulty performing  work activities, household chores, participation in regular exercise program.      Pain:  Current 0/10, worst 8/10, best 0/10   Location: right neck   Description: Aching  Aggravating Factors: See above  Easing Factors: massage, relaxation, heating pad and rest    Pts goals: Pt would like to have full ROM of her neck without pain.          Objective       Posture Alignment: slouched posture    CERVICAL SPINE AROM:   Flexion: Dysfunctional, NP.  25% limited   Extension: WNL   Left Sidebend: Dysfunctional, P! 25% limited   Right Sidebend: Dysfunctional, NP.  10% limited   Left Rotation: WNL   Right Rotation: WNL     THORACIC SPINE AROM:  B ROT - Functional NP    SEGMENTAL MOBILITY: Hypomobility noted throughout CS w/ P-A testing, most notably in C3-5 region.      UPPER EXTREMITY STRENGTH:   Left Right   Shoulder Flexion 4+/5 4+/5   Shoulder Abduction 4+/5 4+/5     Elbow Flexion 4+/5 4+/5   Elbow Extension 4+/5 4+/5   Wrist Flexion 4+/5 4+/5   Wrist Extension 4+/5 4+/5     Dermatomes: Sensation: Light Touch reported to be intact and equal bilaterally  Palpation: Increased tone in R UT compared to L side.      Special Tests:   Left Right   Compression Negative Negative   Distraction Negative Negative   Spurling Negative Negative   VAT n/t n/t     Pt/family was provided educational information, including: role of PT, goals for PT, scheduling - pt verbalized understanding. Discussed insurance limitations with pt.        TREATMENT       Home Exercises and Patient Education Provided    Education provided:   - postural correction, HEP.    Written Home Exercises Provided: yes.  Exercises were reviewed and Rob was able to demonstrate them prior to the end of the session.  Rob demonstrated good  understanding of the education provided.     See EMR under Patient Instructions for exercises provided 2/18/2019.    Assessment     Rob is a 38 y.o. female referred to outpatient Physical Therapy with a medical  diagnosis of cervicalgia with signs and symptoms including: increased R-sided cervical pain, decreased pain-free cervical ROM, postural dysfunction, and impaired mobility consistent with her medical dx.  Functional limitations include difficulty performing household chores, sleeping, lifting objects, praying, and participation in work and her regular exercise program.      Pt with good motivation to perform physical activity and responds well to cueing.      Pt prognosis is Good.   Pt will benefit from skilled outpatient Physical Therapy to address the deficits stated above and in the chart below, provide pt/family education, and to maximize pt's level of independence.     Plan of care discussed with patient: Yes  Pt's spiritual, cultural and educational needs considered and patient is agreeable to the plan of care and goals as stated below:     Anticipated Barriers for therapy: None    Medical Necessity is demonstrated by the following  History  Co-morbidities and personal factors that may impact the plan of care Co-morbidities:   N/a    Personal Factors:   no deficits     low   Examination  Body Structures and Functions, activity limitations and participation restrictions that may impact the plan of care Body Regions:   neck    Body Systems:    gross symmetry  ROM  strength  gross coordinated movement  motor control    Participation Restrictions:   n/a    Activity limitations:   Learning and applying knowledge  no deficits    General Tasks and Commands  no deficits    Communication  no deficits    Mobility  lifting and carrying objects    Self care  no deficits    Domestic Life  cooking  doing house work (cleaning house, washing dishes, laundry)    Interactions/Relationships  no deficits    Life Areas  no deficits    Community and Social Life  recreation and leisure  Jewish and spirituality         low   Clinical Presentation stable and uncomplicated low   Decision Making/ Complexity Score: low     Pt's  spiritual, cultural and educational needs considered and pt agreeable to plan of care and goals as stated below:       Short Term GOALS: 2 weeks. Pt agrees with goals set.  1. Patient demonstrates independence with HEP.   2. Patient demonstrates independence with Postural Awareness.   3. Patient demonstrates independence with body mechanics.   4. Patient will report pain of 2/10 at worst, on 0-10 pain scale, with all activity    Long Term GOALS: 4 weeks. Pt agrees with goals set.  1. Patient reports sleeping through the night without limitations d/t cervical pain for at least 7 consecutive days.    2. Patient demonstrates ability to lift at least 25lbs with good mechanics and no pain in cervical region.    3. Patient reports ability to participate in all prayer activities without limitations d/t cervical pain.  4. Patient will report pain of 1/10 at worst, on 0-10 pain scale, with all activity      PLAN       Plan of care Certification: 2/18/2019 to 5/18/2019.    Outpatient Physical Therapy 2 times weekly for 6 weeks to include the following interventions: Cervical/Lumbar Traction, Electrical Stimulation (TENS), Manual Therapy, Moist Heat/ Ice, Neuromuscular Re-ed, Patient Education, Therapeutic Activites, Therapeutic Exercise and Ultrasound.  Pt may be seen by PTA as part of the rehabilitation team.     Brad Singletary, PT  2/18/2019    I have seen the patient, reviewed the therapist's plan of care, and I agree with the plan of care.      I certify the need for these services furnished under this plan of treatment and while under my care.     ___________________ ________ Physician/Referring Practitioner            ___________________________ Date of Signature

## 2019-03-04 ENCOUNTER — CLINICAL SUPPORT (OUTPATIENT)
Dept: REHABILITATION | Facility: HOSPITAL | Age: 39
End: 2019-03-04
Attending: INTERNAL MEDICINE
Payer: MEDICAID

## 2019-03-04 DIAGNOSIS — R29.3 BAD POSTURE: ICD-10-CM

## 2019-03-04 DIAGNOSIS — M54.2 NECK PAIN ON RIGHT SIDE: ICD-10-CM

## 2019-03-04 PROCEDURE — 97110 THERAPEUTIC EXERCISES: CPT | Mod: PN

## 2019-03-04 NOTE — PROGRESS NOTES
Physical Therapy Daily Treatment Note     Name: Rob Lewis [facility] Number: [id number]    Therapy Diagnosis:   Encounter Diagnoses   Name Primary?    Neck pain on right side     Bad posture      Physician: Federico Pina*    Visit Date: 3/4/2019    Physician Orders: PT Eval and Treat   Medical Diagnosis from Referral: M54.2 (ICD-10-CM) - Neck pain  Evaluation Date: 2/18/2019  Authorization Period Expiration: 2/28/2019  Plan of Care Expiration: 5/18/2019  Visit # / Visits authorized: 2/ 5    Time In: 1305  Time Out: 1350  Total Billable Time: 45 minutes    Precautions: Standard    Subjective     Pt reports: experiencing swelling throughout the R side of her neck extending into her axilla in the days following her initial evaluation, which subsided after 48 hrs and taking an anti-inflammatory.    She was compliant with home exercise program.  Response to previous treatment: See above  Functional change:  None    Pain: 3/10  Location: right neck      Objective     BOLD = Performed Today  + = New Exercise or Progression    Rob received therapeutic exercises to develop strength, endurance, ROM, flexibility and posture for 30 minutes including:     Exercise Resistance Sets/Reps/Hold    UBE  Next visit   + Chin Tucks w/ ANA towel  20x 5 secs    UT, Lev Scap Stretches     + S/L Thoracic ROT stretch  15x B   + Scaption 2lbs 3x 10   + TB Row Green 3x 10                         Rob received the following manual therapy techniques: Joint mobilizations, Manual traction, Myofacial release and Soft tissue Mobilization were applied to the: cervical musculature for 25 minutes, including IASTM.      Rob received hot pack for 0 minutes to cervical spine.    Home Exercises Provided and Patient Education Provided     Education provided:   - Exercise progressions, postural correction    Written Home Exercises Provided: Patient instructed to cont prior HEP.  Exercises were reviewed and Rob  was able to demonstrate them prior to the end of the session.  Rob demonstrated good  understanding of the education provided.     See EMR under Patient Instructions for exercises provided prior visit.    Assessment       Rob is progressing well towards her goals.   She tolerated all exercise progressions well, demonstrating good neuromuscular control over scapular movements and cervical positioning.  She reported decreased tension following manual treatment.  IASTM was performed to upper trap, lev scap, and suboccipital musculature for desensitization.  Increased tone was present on R UT and Lev scap compared to L side.    Pt prognosis is Excellent.     Pt will continue to benefit from skilled outpatient physical therapy to address the deficits listed in the problem list box on initial evaluation, provide pt/family education and to maximize pt's level of independence in the home and community environment.     Pt's spiritual, cultural and educational needs considered and pt agreeable to plan of care and goals.     Anticipated barriers to physical therapy: None    Goals:  Short Term GOALS: 2 weeks. Pt agrees with goals set.  1. Patient demonstrates independence with HEP.   2. Patient demonstrates independence with Postural Awareness.   3. Patient demonstrates independence with body mechanics.   4. Patient will report pain of 2/10 at worst, on 0-10 pain scale, with all activity     Long Term GOALS: 4 weeks. Pt agrees with goals set.  1. Patient reports sleeping through the night without limitations d/t cervical pain for at least 7 consecutive days.    2. Patient demonstrates ability to lift at least 25lbs with good mechanics and no pain in cervical region.    3. Patient reports ability to participate in all prayer activities without limitations d/t cervical pain.  4. Patient will report pain of 1/10 at worst, on 0-10 pain scale, with all activity      Plan     Assess response, continue w/ POC.      Brad  Friend, PT

## 2019-03-06 ENCOUNTER — CLINICAL SUPPORT (OUTPATIENT)
Dept: REHABILITATION | Facility: HOSPITAL | Age: 39
End: 2019-03-06
Attending: INTERNAL MEDICINE
Payer: MEDICAID

## 2019-03-06 DIAGNOSIS — M62.81 MUSCLE WEAKNESS: ICD-10-CM

## 2019-03-06 DIAGNOSIS — M54.2 NECK PAIN ON RIGHT SIDE: ICD-10-CM

## 2019-03-06 DIAGNOSIS — R29.3 BAD POSTURE: ICD-10-CM

## 2019-03-06 PROCEDURE — 97110 THERAPEUTIC EXERCISES: CPT | Mod: PN

## 2019-03-11 ENCOUNTER — CLINICAL SUPPORT (OUTPATIENT)
Dept: REHABILITATION | Facility: HOSPITAL | Age: 39
End: 2019-03-11
Attending: INTERNAL MEDICINE
Payer: MEDICAID

## 2019-03-11 DIAGNOSIS — M54.2 NECK PAIN ON RIGHT SIDE: Primary | ICD-10-CM

## 2019-03-11 DIAGNOSIS — M54.2 NECK PAIN: ICD-10-CM

## 2019-03-11 DIAGNOSIS — M62.81 MUSCLE WEAKNESS: ICD-10-CM

## 2019-03-11 DIAGNOSIS — R29.3 BAD POSTURE: ICD-10-CM

## 2019-03-11 PROCEDURE — 97140 MANUAL THERAPY 1/> REGIONS: CPT | Mod: PN

## 2019-03-11 PROCEDURE — 97110 THERAPEUTIC EXERCISES: CPT | Mod: PN

## 2019-03-11 NOTE — PROGRESS NOTES
"  Physical Therapy Daily Treatment Note     Name: Rob Lewis [place]  Clinic Number: [id number]    Therapy Diagnosis:   Encounter Diagnoses   Name Primary?    Neck pain on right side Yes    Bad posture     Muscle weakness     Neck pain      Physician: Federico Pina    Visit Date: 3/11/2019    Physician Orders: PT Eval and Treat   Medical Diagnosis from Referral: M54.2 (ICD-10-CM) - Neck pain  Evaluation Date: 2/18/2019  Authorization Period Expiration: 2/28/2019  Plan of Care Expiration: 5/18/2019  Visit # / Visits authorized: 4/ 8    Time In: 1540  Time Out: 1630  Total Billable Time: 50 minutes    Precautions: Standard    Subjective     Pt reports: No pain to report today. Patient continues with exercises outside of therapy sessions.     She was compliant with home exercise program.  Response to previous treatment: See above  Functional change:  None    Pain: 0/10  Location: right neck      Objective     BOLD = Performed Today  + = New Exercise or Progression    Rob received therapeutic exercises to develop strength, endurance, ROM, flexibility and posture for 35 minutes including:     Exercise Resistance Sets/Reps/Hold    UBE  6 mins     Chin Tucks w/ ANA towel  20x 5 secs    UT, Lev Scap Stretches  3 x 30"     S/L Thoracic ROT stretch  15x B    Scaption 2lbs 3x 10    TB Row Green 3x 10    Supine HABD w/ TB on 1/2 FR Red 3x 10   + Shoulder extension  Green  3 x 10              Rob received the following manual therapy techniques: Joint mobilizations, Manual traction, Myofacial release and Soft tissue Mobilization were applied to the: cervical musculature for 15 minutes, including IASTM.      Rob received hot pack for 10 minutes to cervical spine.    Home Exercises Provided and Patient Education Provided     Education provided:   - Exercise progressions, postural correction    Written Home Exercises Provided: Patient instructed to cont prior HEP.  Exercises were reviewed and " Rob was able to demonstrate them prior to the end of the session.  Rob demonstrated good  understanding of the education provided.     See EMR under Patient Instructions for exercises provided prior visit.    Assessment       Rob is progressing well towards her goals.  Continues to benefit from skilled therapies for progression of strength and stretching activities. How pack applied to cervical area end of session. Patient motivated to progress. Appropriate level of muscle fatigue achieved with today's activities.     Pt prognosis is Excellent.     Pt will continue to benefit from skilled outpatient physical therapy to address the deficits listed in the problem list box on initial evaluation, provide pt/family education and to maximize pt's level of independence in the home and community environment.     Pt's spiritual, cultural and educational needs considered and pt agreeable to plan of care and goals.     Anticipated barriers to physical therapy: None    Goals:  Short Term GOALS: 2 weeks. Pt agrees with goals set.  1. Patient demonstrates independence with HEP.   2. Patient demonstrates independence with Postural Awareness.   3. Patient demonstrates independence with body mechanics.   4. Patient will report pain of 2/10 at worst, on 0-10 pain scale, with all activity     Long Term GOALS: 4 weeks. Pt agrees with goals set.  1. Patient reports sleeping through the night without limitations d/t cervical pain for at least 7 consecutive days.    2. Patient demonstrates ability to lift at least 25lbs with good mechanics and no pain in cervical region.    3. Patient reports ability to participate in all prayer activities without limitations d/t cervical pain.  4. Patient will report pain of 1/10 at worst, on 0-10 pain scale, with all activity      Plan     Assess response, continue w/ POC.      Richard Vaughn, PTA

## 2019-03-18 ENCOUNTER — CLINICAL SUPPORT (OUTPATIENT)
Dept: REHABILITATION | Facility: HOSPITAL | Age: 39
End: 2019-03-18
Attending: INTERNAL MEDICINE
Payer: MEDICAID

## 2019-03-18 DIAGNOSIS — M54.2 NECK PAIN ON RIGHT SIDE: Primary | ICD-10-CM

## 2019-03-18 DIAGNOSIS — R29.3 BAD POSTURE: ICD-10-CM

## 2019-03-18 PROCEDURE — 97110 THERAPEUTIC EXERCISES: CPT | Mod: PN

## 2019-03-18 NOTE — PROGRESS NOTES
"  Physical Therapy Daily Treatment Note     Name: Rob Lewis [place]  Clinic Number: [id number]    Therapy Diagnosis:   Encounter Diagnoses   Name Primary?    Neck pain on right side Yes    Bad posture      Physician: Federico Pina*    Visit Date: 3/18/2019    Physician Orders: PT Eval and Treat   Medical Diagnosis from Referral: M54.2 (ICD-10-CM) - Neck pain  Evaluation Date: 2/18/2019  Authorization Period Expiration: 2/28/2019  Plan of Care Expiration: 5/18/2019  Visit # / Visits authorized: 5/ 8  PTA Visit 1/6     Time In: 1645  Time Out: 1730  Total Billable Time: 30 minutes    Precautions: Standard    Subjective     Pt reports: Swelling in R neck, arm pit area. Patient reports seeing MD last week regarding this, no concerns at this time.     She was compliant with home exercise program.  Response to previous treatment: See above  Functional change:  None    Pain: 0/10  Location: right neck      Objective     BOLD = Performed Today  + = New Exercise or Progression    Rob received therapeutic exercises to develop strength, endurance, ROM, flexibility and posture for 45 minutes including:      Exercise Resistance Sets/Reps/Hold    UBE  6 mins     Chin Tucks w/ ANA towel  20x 5 secs    UT, Lev Scap Stretches  3 x 30"     S/L Thoracic ROT stretch  15x B    Scaption 2lbs 3x 10    TB Row Green 3x 10    Supine HABD w/ TB on 1/2 FR Red 3x 10    Shoulder extension  Green  3 x 10     +Seated no moneys Red 3 x 10        Rob received the following manual therapy techniques: Joint mobilizations, Manual traction, Myofacial release and Soft tissue Mobilization were applied to the: cervical musculature for 15 minutes, including IASTM.      Rob received hot pack for 10 minutes to cervical spine.    Home Exercises Provided and Patient Education Provided     Education provided:   - Exercise progressions, postural correction    Written Home Exercises Provided: Patient instructed to cont prior " HEP.  Exercises were reviewed and Rob was able to demonstrate them prior to the end of the session.  Rob demonstrated good  understanding of the education provided.     See EMR under Patient Instructions for exercises provided prior visit.    Assessment       Rob is progressing well towards her goals.  Patient reports researching lymph massage to areas with swelling. Patient encouraged to be gentle with massage, especially over lymph nodes. Patient continues to progress in therapies. Encouraged to continue with HEP outside of sessions.     Pt prognosis is Excellent.     Pt will continue to benefit from skilled outpatient physical therapy to address the deficits listed in the problem list box on initial evaluation, provide pt/family education and to maximize pt's level of independence in the home and community environment.     Pt's spiritual, cultural and educational needs considered and pt agreeable to plan of care and goals.     Anticipated barriers to physical therapy: None    Goals:  Short Term GOALS: 2 weeks. Pt agrees with goals set.  1. Patient demonstrates independence with HEP.   2. Patient demonstrates independence with Postural Awareness.   3. Patient demonstrates independence with body mechanics.   4. Patient will report pain of 2/10 at worst, on 0-10 pain scale, with all activity     Long Term GOALS: 4 weeks. Pt agrees with goals set.  1. Patient reports sleeping through the night without limitations d/t cervical pain for at least 7 consecutive days.    2. Patient demonstrates ability to lift at least 25lbs with good mechanics and no pain in cervical region.    3. Patient reports ability to participate in all prayer activities without limitations d/t cervical pain.  4. Patient will report pain of 1/10 at worst, on 0-10 pain scale, with all activity      Plan     Assess response, continue w/ POC.      Richard Vaughn, PTA

## 2019-03-21 DIAGNOSIS — M54.2 CERVICALGIA: Primary | ICD-10-CM

## 2019-03-21 DIAGNOSIS — M25.511 RIGHT SHOULDER PAIN: ICD-10-CM

## 2019-03-22 ENCOUNTER — HOSPITAL ENCOUNTER (OUTPATIENT)
Dept: RADIOLOGY | Facility: HOSPITAL | Age: 39
Discharge: HOME | End: 2019-03-22
Attending: INTERNAL MEDICINE
Payer: MEDICAID

## 2019-03-22 DIAGNOSIS — M25.511 RIGHT SHOULDER PAIN: Primary | ICD-10-CM

## 2019-03-22 DIAGNOSIS — M25.511 RIGHT SHOULDER PAIN: ICD-10-CM

## 2019-03-22 PROCEDURE — 73030 XR SHOULDER COMPLETE 2 OR MORE VIEWS RIGHT: ICD-10-PCS | Mod: 26,RT,, | Performed by: RADIOLOGY

## 2019-03-22 PROCEDURE — 73030 X-RAY EXAM OF SHOULDER: CPT | Mod: 26,RT,, | Performed by: RADIOLOGY

## 2019-03-22 PROCEDURE — 73030 X-RAY EXAM OF SHOULDER: CPT | Mod: TC,FY,RT

## 2019-03-29 ENCOUNTER — CLINICAL SUPPORT (OUTPATIENT)
Dept: REHABILITATION | Facility: HOSPITAL | Age: 39
End: 2019-03-29
Attending: INTERNAL MEDICINE
Payer: MEDICAID

## 2019-03-29 DIAGNOSIS — M54.2 NECK PAIN: ICD-10-CM

## 2019-03-29 DIAGNOSIS — M62.81 MUSCLE WEAKNESS: Primary | ICD-10-CM

## 2019-03-29 DIAGNOSIS — R29.3 BAD POSTURE: ICD-10-CM

## 2019-03-29 DIAGNOSIS — M54.2 NECK PAIN ON RIGHT SIDE: ICD-10-CM

## 2019-03-29 PROCEDURE — 97110 THERAPEUTIC EXERCISES: CPT | Mod: PN

## 2019-03-29 NOTE — PROGRESS NOTES
"  Physical Therapy Daily Treatment Note     Name: Rob Lewis [place]  Clinic Number: [id number]    Therapy Diagnosis:   Encounter Diagnoses   Name Primary?    Muscle weakness Yes    Neck pain on right side     Bad posture     Neck pain      Physician: Federico Pina    Visit Date: 3/29/2019    Physician Orders: PT Eval and Treat   Medical Diagnosis from Referral: M54.2 (ICD-10-CM) - Neck pain  Evaluation Date: 2/18/2019  Authorization Period Expiration: 2/28/2019  Plan of Care Expiration: 5/18/2019  Visit # / Visits authorized: 5/ 8  PTA Visit 1/6     Time In: 1615 (Patient arrives late)   Time Out: 1700  Total Billable Time: 45 minutes    Precautions: Standard    Subjective     Pt reports: No pain to report today. States neck has been feeling good.     She was compliant with home exercise program.  Response to previous treatment: See above  Functional change:  None    Pain: 0/10  Location: right neck      Objective     BOLD = Performed Today  + = New Exercise or Progression    Rob received therapeutic exercises to develop strength, endurance, ROM, flexibility and posture for 45 minutes including:      Exercise Resistance Sets/Reps/Hold    UBE  6 mins     Chin Tucks w/ ANA towel  20x 5 secs    UT, Lev Scap Stretches  3 x 30"     S/L Thoracic ROT stretch  15x B    Scaption 2lbs 3x 10    TB Row Green 3x 10    Supine HABD w/ TB on 1/2 FR Red 3x 10    Shoulder extension  Green  3 x 10     Seated no moneys Red 3 x 10    + Wall slide w/lift off      2 x 10   + Supine shoulder flexion 1/2 FR 2# dowel 3 x 10      Rob received the following manual therapy techniques: Joint mobilizations, Manual traction, Myofacial release and Soft tissue Mobilization were applied to the: cervical musculature for 15 minutes, including IASTM.      Rob received hot pack for 10 minutes to cervical spine.    Home Exercises Provided and Patient Education Provided     Education provided:   - Exercise " progressions, postural correction    Written Home Exercises Provided: Patient instructed to cont prior HEP.  Exercises were reviewed and Rob was able to demonstrate them prior to the end of the session.  Rob demonstrated good  understanding of the education provided.     See EMR under Patient Instructions for exercises provided prior visit.    Assessment       Rob is progressing well towards her goals.  Staff progressed strengthening activities with no increase in symptoms with prescribed activities. Continues to benefit from skilled therapies to progress strength.     Pt prognosis is Excellent.     Pt will continue to benefit from skilled outpatient physical therapy to address the deficits listed in the problem list box on initial evaluation, provide pt/family education and to maximize pt's level of independence in the home and community environment.     Pt's spiritual, cultural and educational needs considered and pt agreeable to plan of care and goals.     Anticipated barriers to physical therapy: None    Goals:  Short Term GOALS: 2 weeks. Pt agrees with goals set.  1. Patient demonstrates independence with HEP.   2. Patient demonstrates independence with Postural Awareness.   3. Patient demonstrates independence with body mechanics.   4. Patient will report pain of 2/10 at worst, on 0-10 pain scale, with all activity     Long Term GOALS: 4 weeks. Pt agrees with goals set.  1. Patient reports sleeping through the night without limitations d/t cervical pain for at least 7 consecutive days.    2. Patient demonstrates ability to lift at least 25lbs with good mechanics and no pain in cervical region.    3. Patient reports ability to participate in all prayer activities without limitations d/t cervical pain.  4. Patient will report pain of 1/10 at worst, on 0-10 pain scale, with all activity      Plan     Assess response, continue w/ POC.      Richard Vaughn, PTA

## 2019-04-01 ENCOUNTER — CLINICAL SUPPORT (OUTPATIENT)
Dept: REHABILITATION | Facility: HOSPITAL | Age: 39
End: 2019-04-01
Attending: INTERNAL MEDICINE
Payer: MEDICAID

## 2019-04-01 DIAGNOSIS — M54.2 NECK PAIN ON RIGHT SIDE: ICD-10-CM

## 2019-04-01 DIAGNOSIS — R29.3 BAD POSTURE: ICD-10-CM

## 2019-04-01 PROCEDURE — 97110 THERAPEUTIC EXERCISES: CPT | Mod: PN

## 2019-04-01 NOTE — PROGRESS NOTES
"  Physical Therapy Daily Treatment Note     Name: Rob Lewis [place]  Clinic Number: [id number]    Therapy Diagnosis:   Encounter Diagnoses   Name Primary?    Neck pain on right side     Bad posture      Physician: Federico Pina*    Visit Date: 4/1/2019    Physician Orders: PT Eval and Treat   Medical Diagnosis from Referral: M54.2 (ICD-10-CM) - Neck pain  Evaluation Date: 2/18/2019  Authorization Period Expiration: 2/28/2019  Plan of Care Expiration: 5/18/2019  Visit # / Visits authorized: 6/ 8  PTA Visit 2/6     Time In: 1610  Time Out: 1705   Total Billable Time: 55 minutes    Precautions: Standard    Subjective     Pt reports: Feels good, no new complaints or concerns.     She was compliant with home exercise program.  Response to previous treatment: See above  Functional change:  None    Pain: 0/10  Location: right neck      Objective     BOLD = Performed Today  + = New Exercise or Progression    Rob received therapeutic exercises to develop strength, endurance, ROM, flexibility and posture for 60 minutes including:      Exercise Resistance Sets/Reps/Hold    UBE  6 mins     Chin Tucks w/ ANA towel  20x 5 secs    UT, Lev Scap Stretches  3 x 30"     S/L Thoracic ROT stretch  15x B    Scaption 2lbs 3x 10    TB Row Green 3x 10    Supine HABD w/ TB on 1/2 FR Red 3x 10    Shoulder extension  Green  3 x 10     Seated no moneys Red 3 x 10     Wall slide w/lift off   YTB 2 x 10     Supine shoulder flexion 1/2 FR 3# dowel  3 x 10    + Push up plus   2 x 10    + Corner stretch   10 x 10"    + Prone Y  2 x 10    + Prone T  2 x 10          Rob received the following manual therapy techniques: Joint mobilizations, Manual traction, Myofacial release and Soft tissue Mobilization were applied to the: cervical musculature for 0 minutes, including IASTM.      Rob received hot pack for 10 minutes to cervical spine.    Home Exercises Provided and Patient Education Provided     Education provided: "   - Exercise progressions, postural correction    Written Home Exercises Provided: Patient instructed to cont prior HEP.  Exercises were reviewed and Rob was able to demonstrate them prior to the end of the session.  Rob demonstrated good  understanding of the education provided.     See EMR under Patient Instructions for exercises provided prior visit.    Assessment       Rob is progressing well towards her goals. Staff progressed resistance today with exercises, appropriate level of muscle fatigue. Patient continues to improve in sessions and tolerate additional strengthening activities.     Pt prognosis is Excellent.     Pt will continue to benefit from skilled outpatient physical therapy to address the deficits listed in the problem list box on initial evaluation, provide pt/family education and to maximize pt's level of independence in the home and community environment.     Pt's spiritual, cultural and educational needs considered and pt agreeable to plan of care and goals.     Anticipated barriers to physical therapy: None    Goals:  Short Term GOALS: 2 weeks. Pt agrees with goals set.  1. Patient demonstrates independence with HEP.   2. Patient demonstrates independence with Postural Awareness.   3. Patient demonstrates independence with body mechanics.   4. Patient will report pain of 2/10 at worst, on 0-10 pain scale, with all activity     Long Term GOALS: 4 weeks. Pt agrees with goals set.  1. Patient reports sleeping through the night without limitations d/t cervical pain for at least 7 consecutive days.    2. Patient demonstrates ability to lift at least 25lbs with good mechanics and no pain in cervical region.    3. Patient reports ability to participate in all prayer activities without limitations d/t cervical pain.  4. Patient will report pain of 1/10 at worst, on 0-10 pain scale, with all activity      Plan     Assess response, continue w/ POC.      Richard Vaughn, PTA

## 2019-04-03 ENCOUNTER — CLINICAL SUPPORT (OUTPATIENT)
Dept: REHABILITATION | Facility: HOSPITAL | Age: 39
End: 2019-04-03
Attending: INTERNAL MEDICINE
Payer: MEDICAID

## 2019-04-03 DIAGNOSIS — R29.3 BAD POSTURE: ICD-10-CM

## 2019-04-03 DIAGNOSIS — M54.2 NECK PAIN: ICD-10-CM

## 2019-04-03 DIAGNOSIS — M54.2 NECK PAIN ON RIGHT SIDE: Primary | ICD-10-CM

## 2019-04-03 DIAGNOSIS — M62.81 MUSCLE WEAKNESS: ICD-10-CM

## 2019-04-03 PROCEDURE — 97110 THERAPEUTIC EXERCISES: CPT | Mod: PN

## 2019-04-03 NOTE — PROGRESS NOTES
"  Physical Therapy Daily Treatment Note     Name: Rob Lewis [place]  Clinic Number: [id number]    Therapy Diagnosis:   Encounter Diagnoses   Name Primary?    Neck pain on right side Yes    Bad posture     Neck pain     Muscle weakness      Physician: Federico Pina    Visit Date: 4/3/2019    Physician Orders: PT Eval and Treat   Medical Diagnosis from Referral: M54.2 (ICD-10-CM) - Neck pain  Evaluation Date: 2/18/2019  Authorization Period Expiration: 2/28/2019  Plan of Care Expiration: 5/18/2019  Visit # / Visits authorized: 6/ 8  PTA Visit 2/6     Time In: 1600  Time Out: 1650   Total Billable Time: 30 minutes    Precautions: Standard    Subjective     Pt reports: No pain to report today.     She was compliant with home exercise program.  Response to previous treatment: See above  Functional change:  None    Pain: 0/10  Location: right neck      Objective     BOLD = Performed Today  + = New Exercise or Progression    Rob received therapeutic exercises to develop strength, endurance, ROM, flexibility and posture for 50 minutes including:      Exercise Resistance Sets/Reps/Hold    UBE  8 mins     Chin Tucks w/ ANA towel  20x 5 secs    UT, Lev Scap Stretches  3 x 30"     S/L Thoracic ROT stretch  15x B    Scaption 2lbs 3x 10    TB Row Green 3x 10    Supine HABD w/ TB on 1/2 FR Red 3x 10    Shoulder extension  Green  3 x 10     Seated no moneys Red 3 x 10     Wall slide w/lift off   YTB 2 x 10     Supine shoulder flexion 1/2 FR 3# dowel  3 x 10     Push up plus-edge of mat  2 x 10     Corner stretch   10 x 10"     Prone Y  2 x 10     Prone T  2 x 10    + Prone W  2 x 10    + Prone I  2 x 10                Rob received the following manual therapy techniques: Joint mobilizations, Manual traction, Myofacial release and Soft tissue Mobilization were applied to the: cervical musculature for 0 minutes, including IASTM.      Rob received hot pack for 10 minutes to cervical " spine.    Home Exercises Provided and Patient Education Provided     Education provided:   - Exercise progressions, postural correction    Written Home Exercises Provided: Patient instructed to cont prior HEP.  Exercises were reviewed and Rob was able to demonstrate them prior to the end of the session.  Rob demonstrated good  understanding of the education provided.     See EMR under Patient Instructions for exercises provided prior visit.    Assessment       Rob is progressing well towards her goals. Patient tolerated progression of strengthening activities well. Appropriate level of muscle fatigue achieved with prescribed activities. Minor cues for proper technique with prone exercises for posture.     Pt prognosis is Excellent.     Pt will continue to benefit from skilled outpatient physical therapy to address the deficits listed in the problem list box on initial evaluation, provide pt/family education and to maximize pt's level of independence in the home and community environment.     Pt's spiritual, cultural and educational needs considered and pt agreeable to plan of care and goals.     Anticipated barriers to physical therapy: None    Goals:  Short Term GOALS: 2 weeks. Pt agrees with goals set.  1. Patient demonstrates independence with HEP.   2. Patient demonstrates independence with Postural Awareness.   3. Patient demonstrates independence with body mechanics.   4. Patient will report pain of 2/10 at worst, on 0-10 pain scale, with all activity     Long Term GOALS: 4 weeks. Pt agrees with goals set.  1. Patient reports sleeping through the night without limitations d/t cervical pain for at least 7 consecutive days.    2. Patient demonstrates ability to lift at least 25lbs with good mechanics and no pain in cervical region.    3. Patient reports ability to participate in all prayer activities without limitations d/t cervical pain.  4. Patient will report pain of 1/10 at worst, on 0-10  pain scale, with all activity      Plan     Assess response, continue w/ POC.      Richard Vaughn, PTA

## 2019-04-08 ENCOUNTER — CLINICAL SUPPORT (OUTPATIENT)
Dept: REHABILITATION | Facility: HOSPITAL | Age: 39
End: 2019-04-08
Attending: INTERNAL MEDICINE
Payer: MEDICAID

## 2019-04-08 DIAGNOSIS — M54.2 NECK PAIN ON RIGHT SIDE: ICD-10-CM

## 2019-04-08 DIAGNOSIS — R29.3 BAD POSTURE: ICD-10-CM

## 2019-04-08 DIAGNOSIS — M62.81 MUSCLE WEAKNESS: ICD-10-CM

## 2019-04-08 PROCEDURE — 97110 THERAPEUTIC EXERCISES: CPT | Mod: PN

## 2019-04-08 NOTE — PROGRESS NOTES
"  Physical Therapy Daily Treatment Note     Name: Rob Lewis [place]  Clinic Number: [id number]    Therapy Diagnosis:   Encounter Diagnoses   Name Primary?    Neck pain on right side     Bad posture      Physician: Federico Pina*    Visit Date: 4/8/2019    Physician Orders: PT Eval and Treat   Medical Diagnosis from Referral: M54.2 (ICD-10-CM) - Neck pain  Evaluation Date: 2/18/2019  Authorization Period Expiration: 2/28/2019  Plan of Care Expiration: 5/18/2019  Visit # / Visits authorized: 7/ 8    Time In: 1600  Time Out: 1650   Total Billable Time: 30 minutes    Precautions: Standard    Subjective     Pt reports: No new complaints.      She was compliant with home exercise program.  Response to previous treatment: See above  Functional change:  None    Pain: 0/10  Location: right neck      Objective     BOLD = Performed Today  + = New Exercise or Progression    Rob received therapeutic exercises to develop strength, endurance, ROM, flexibility and posture for 50 minutes including:      Exercise Resistance Sets/Reps/Hold    UBE  8 mins     Chin Tucks w/ ANA towel  20x 5 secs    UT, Lev Scap Stretches  3 x 30"     S/L Thoracic ROT stretch  15x B    Scaption 2lbs 3x 10   + Row on Matrix 40lbs 3x 10    Supine HABD w/ TB on 1/2 FR Red 3x 10   + Shoulder extension on CC 7lbs 3 x 10     Seated no moneys Red 3 x 10     Wall slide w/lift off   YTB 2 x 10     Supine shoulder flexion 1/2 FR 3# dowel  3 x 10     Push up plus-edge of mat  2 x 10     Corner stretch   10 x 10"     Prone Y  2 x 10     Prone T  2 x 10     Prone W  2 x 10     Prone I  2 x 10    + Bench Row 10lbs 2x10   + Hooklying Unilateral Y's  5 secsx 20   + Planks on forearms  3x25 secs                           Rob received the following manual therapy techniques: Joint mobilizations, Manual traction, Myofacial release and Soft tissue Mobilization were applied to the: cervical musculature for 0 minutes, including IASTM.  "     Rob received hot pack for 10 minutes to cervical spine.    Home Exercises Provided and Patient Education Provided     Education provided:   - Exercise progressions, postural correction    Written Home Exercises Provided: Patient instructed to cont prior HEP.  Exercises were reviewed and Rob was able to demonstrate them prior to the end of the session.  Rob demonstrated good  understanding of the education provided.     See EMR under Patient Instructions for exercises provided prior visit.    Assessment       Rob is progressing well towards her goals. Patient tolerated progression of strengthening activities well. Appropriate level of muscle fatigue achieved with prescribed activities. Minor cues for proper technique with prone exercises for posture.     Pt prognosis is Excellent.     Pt will continue to benefit from skilled outpatient physical therapy to address the deficits listed in the problem list box on initial evaluation, provide pt/family education and to maximize pt's level of independence in the home and community environment.     Pt's spiritual, cultural and educational needs considered and pt agreeable to plan of care and goals.     Anticipated barriers to physical therapy: None    Goals:  Short Term GOALS: 2 weeks. Pt agrees with goals set.  1. Patient demonstrates independence with HEP.   2. Patient demonstrates independence with Postural Awareness.   3. Patient demonstrates independence with body mechanics.   4. Patient will report pain of 2/10 at worst, on 0-10 pain scale, with all activity     Long Term GOALS: 4 weeks. Pt agrees with goals set.  1. Patient reports sleeping through the night without limitations d/t cervical pain for at least 7 consecutive days.    2. Patient demonstrates ability to lift at least 25lbs with good mechanics and no pain in cervical region.    3. Patient reports ability to participate in all prayer activities without limitations d/t cervical  pain.  4. Patient will report pain of 1/10 at worst, on 0-10 pain scale, with all activity      Plan     Assess response, continue w/ POC.      Brad Singletary, PT

## 2019-04-10 ENCOUNTER — CLINICAL SUPPORT (OUTPATIENT)
Dept: REHABILITATION | Facility: HOSPITAL | Age: 39
End: 2019-04-10
Attending: INTERNAL MEDICINE
Payer: MEDICAID

## 2019-04-10 DIAGNOSIS — M54.2 NECK PAIN ON RIGHT SIDE: Primary | ICD-10-CM

## 2019-04-10 DIAGNOSIS — M62.81 MUSCLE WEAKNESS: ICD-10-CM

## 2019-04-10 DIAGNOSIS — M54.2 NECK PAIN: ICD-10-CM

## 2019-04-10 DIAGNOSIS — R29.3 BAD POSTURE: ICD-10-CM

## 2019-04-10 DIAGNOSIS — M25.60 JOINT STIFFNESS: ICD-10-CM

## 2019-04-10 PROCEDURE — 97110 THERAPEUTIC EXERCISES: CPT | Mod: PN

## 2019-04-10 NOTE — PROGRESS NOTES
"  Physical Therapy Daily Treatment Note     Name: Rob Lewis [place]  Clinic Number: [id number]    Therapy Diagnosis:   Encounter Diagnoses   Name Primary?    Neck pain on right side Yes    Muscle weakness     Neck pain     Bad posture     Joint stiffness      Physician: Federico Pina*    Visit Date: 4/10/2019    Physician Orders: PT Eval and Treat   Medical Diagnosis from Referral: M54.2 (ICD-10-CM) - Neck pain  Evaluation Date: 2/18/2019  Authorization Period Expiration: 2/28/2019  Plan of Care Expiration: 5/18/2019  Visit # / Visits authorized: 8/ 8    Time In: 1600  Time Out: 1650  Total Billable Time: 30 minutes    Precautions: Standard    Subjective     Pt reports: Feels good today, no pain to report.     She was compliant with home exercise program.  Response to previous treatment: See above  Functional change:  None    Pain: 0/10  Location: right neck      Objective     BOLD = Performed Today  + = New Exercise or Progression    Rob received therapeutic exercises to develop strength, endurance, ROM, flexibility and posture for 50 minutes including:      Exercise Resistance Sets/Reps/Hold    UBE  8 mins     Chin Tucks w/ ANA towel  20x 5 secs    UT, Lev Scap Stretches  3 x 30"     S/L Thoracic ROT stretch  15x B    Scaption 3lbs 3x 10    Row on Matrix 40lbs 3x 10    Supine HABD w/ TB on 1/2 FR Red 3x 10    Shoulder extension on CC 7lbs 3 x 10     Seated no moneys Red 3 x 10     Wall slide w/lift off   YTB 2 x 10     Supine shoulder flexion 1/2 FR 3# dowel  3 x 10     Push up plus-edge of mat  2 x 10     Corner stretch   10 x 10"     Prone Y  2 x 10     Prone T  2 x 10     Prone W  2 x 10     Prone I  2 x 10     Bench Row 10lbs 2x10    Hooklying Unilateral Y's  5 secsx 20    Planks on forearms  3x25 secs                           Rob received the following manual therapy techniques: Joint mobilizations, Manual traction, Myofacial release and Soft tissue Mobilization were applied " to the: cervical musculature for 0 minutes, including IASTM.      Rob received hot pack for 10 minutes to cervical spine.    Home Exercises Provided and Patient Education Provided     Education provided:   - Exercise progressions, postural correction    Written Home Exercises Provided: Patient instructed to cont prior HEP.  Exercises were reviewed and Rob was able to demonstrate them prior to the end of the session.  Rob demonstrated good  understanding of the education provided.     See EMR under Patient Instructions for exercises provided prior visit.    Assessment       Rob is progressing well towards her goals. Patient tolerated today's session well. No increase in pain with any of today's activities. Patient continues to improve in strength and tolerate more during each session. Continues to benefit from skilled therapies for staff guidance and cues for progression of exercise routine.   Pt prognosis is Excellent.     Pt will continue to benefit from skilled outpatient physical therapy to address the deficits listed in the problem list box on initial evaluation, provide pt/family education and to maximize pt's level of independence in the home and community environment.     Pt's spiritual, cultural and educational needs considered and pt agreeable to plan of care and goals.     Anticipated barriers to physical therapy: None    Goals:  Short Term GOALS: 2 weeks. Pt agrees with goals set.  1. Patient demonstrates independence with HEP.   2. Patient demonstrates independence with Postural Awareness.   3. Patient demonstrates independence with body mechanics.   4. Patient will report pain of 2/10 at worst, on 0-10 pain scale, with all activity     Long Term GOALS: 4 weeks. Pt agrees with goals set.  1. Patient reports sleeping through the night without limitations d/t cervical pain for at least 7 consecutive days.    2. Patient demonstrates ability to lift at least 25lbs with good mechanics  and no pain in cervical region.    3. Patient reports ability to participate in all prayer activities without limitations d/t cervical pain.  4. Patient will report pain of 1/10 at worst, on 0-10 pain scale, with all activity      Plan     Assess response, continue w/ POC.      Richard Vaughn, PTA

## 2019-04-15 ENCOUNTER — CLINICAL SUPPORT (OUTPATIENT)
Dept: REHABILITATION | Facility: HOSPITAL | Age: 39
End: 2019-04-15
Attending: INTERNAL MEDICINE
Payer: MEDICAID

## 2019-04-15 DIAGNOSIS — R29.3 BAD POSTURE: ICD-10-CM

## 2019-04-15 DIAGNOSIS — M54.2 NECK PAIN ON RIGHT SIDE: ICD-10-CM

## 2019-04-15 PROCEDURE — 97110 THERAPEUTIC EXERCISES: CPT | Mod: PN

## 2019-04-15 NOTE — PROGRESS NOTES
"  Physical Therapy Daily Treatment Note     Name: Rob Lewis [place]  Clinic Number: [id number]    Therapy Diagnosis:   No diagnosis found.  Physician: Federico Pina*    Visit Date: 4/15/2019    Physician Orders: PT Eval and Treat   Medical Diagnosis from Referral: M54.2 (ICD-10-CM) - Neck pain  Evaluation Date: 2/18/2019  Authorization Period Expiration: 2/28/2019  Plan of Care Expiration: 5/18/2019  Visit # / Visits authorized: 9/ 20    Time In: 1600  Time Out: 1650  Total Billable Time: 30 minutes    Precautions: Standard    Subjective     Pt reports: Feels good today, no pain to report.     She was compliant with home exercise program.  Response to previous treatment: See above  Functional change:  None    Pain: 0/10  Location: right neck      Objective     BOLD = Performed Today  + = New Exercise or Progression    Rob received therapeutic exercises to develop strength, endurance, ROM, flexibility and posture for 50 minutes including:      Exercise Resistance Sets/Reps/Hold    UBE  8 mins     Chin Tucks w/ ANA towel  20x 5 secs    UT, Lev Scap Stretches  3 x 30"     S/L Thoracic ROT stretch  15x B    Scaption 3lbs 3x 10    Row on Matrix 40lbs 3x 10    Supine HABD w/ TB on 1/2 FR Red 3x 10    Shoulder extension on CC 7lbs 3 x 10     Seated no moneys Red 3 x 10     Wall slide w/lift off   YTB 2 x 10     Supine shoulder flexion 1/2 FR 3# dowel  3 x 10     Push up plus-edge of mat  2 x 10     Corner stretch   10 x 10"     Prone Y  2 x 10     Prone T  2 x 10     Prone W  2 x 10     Prone I  2 x 10     Bench Row 10lbs 2x10    Hooklying Unilateral Y's  5 secsx 20    Planks on forearms  3x25 secs                           Rob received the following manual therapy techniques: Joint mobilizations, Manual traction, Myofacial release and Soft tissue Mobilization were applied to the: cervical musculature for 0 minutes, including IASTM.      Rob received hot pack for 10 minutes to cervical " spine.    Home Exercises Provided and Patient Education Provided     Education provided:   - Exercise progressions, postural correction    Written Home Exercises Provided: Patient instructed to cont prior HEP.  Exercises were reviewed and Rob was able to demonstrate them prior to the end of the session.  Rob demonstrated good  understanding of the education provided.     See EMR under Patient Instructions for exercises provided prior visit.    Assessment       Rob is progressing well towards her goals. Patient tolerated today's session well. No increase in pain with any of today's activities. Patient continues to improve in strength and tolerate more during each session. Continues to benefit from skilled therapies for staff guidance and cues for progression of exercise routine.   Pt prognosis is Excellent.     Pt will continue to benefit from skilled outpatient physical therapy to address the deficits listed in the problem list box on initial evaluation, provide pt/family education and to maximize pt's level of independence in the home and community environment.     Pt's spiritual, cultural and educational needs considered and pt agreeable to plan of care and goals.     Anticipated barriers to physical therapy: None    Goals:  Short Term GOALS: 2 weeks. Pt agrees with goals set.  1. Patient demonstrates independence with HEP.   2. Patient demonstrates independence with Postural Awareness.   3. Patient demonstrates independence with body mechanics.   4. Patient will report pain of 2/10 at worst, on 0-10 pain scale, with all activity     Long Term GOALS: 4 weeks. Pt agrees with goals set.  1. Patient reports sleeping through the night without limitations d/t cervical pain for at least 7 consecutive days.    2. Patient demonstrates ability to lift at least 25lbs with good mechanics and no pain in cervical region.    3. Patient reports ability to participate in all prayer activities without limitations  d/t cervical pain.  4. Patient will report pain of 1/10 at worst, on 0-10 pain scale, with all activity      Plan     Assess response, continue w/ POC.      Brad Singletary, PT

## 2019-04-17 ENCOUNTER — CLINICAL SUPPORT (OUTPATIENT)
Dept: REHABILITATION | Facility: HOSPITAL | Age: 39
End: 2019-04-17
Attending: INTERNAL MEDICINE
Payer: MEDICAID

## 2019-04-17 DIAGNOSIS — R29.3 BAD POSTURE: ICD-10-CM

## 2019-04-17 DIAGNOSIS — M54.2 NECK PAIN ON RIGHT SIDE: Primary | ICD-10-CM

## 2019-04-17 DIAGNOSIS — M62.81 MUSCLE WEAKNESS: ICD-10-CM

## 2019-04-17 DIAGNOSIS — M25.60 JOINT STIFFNESS: ICD-10-CM

## 2019-04-17 DIAGNOSIS — M25.561 RIGHT KNEE PAIN, UNSPECIFIED CHRONICITY: ICD-10-CM

## 2019-04-17 DIAGNOSIS — M54.2 NECK PAIN: ICD-10-CM

## 2019-04-17 PROCEDURE — 97110 THERAPEUTIC EXERCISES: CPT | Mod: PN

## 2019-04-17 NOTE — PROGRESS NOTES
"  Physical Therapy Daily Treatment Note     Name: Rob Lewis [place]  Clinic Number: [id number]    Therapy Diagnosis:   Encounter Diagnoses   Name Primary?    Neck pain on right side Yes    Bad posture     Muscle weakness     Neck pain     Joint stiffness     Right knee pain, unspecified chronicity      Physician: Federico Pina*    Visit Date: 4/17/2019    Physician Orders: PT Eval and Treat   Medical Diagnosis from Referral: M54.2 (ICD-10-CM) - Neck pain  Evaluation Date: 2/18/2019  Authorization Period Expiration: 7/16/2019  Plan of Care Expiration: 5/18/2019  Visit # / Visits authorized: 8/ 20  PTA Visit 1/6     Time In: 1620 pm  Time Out: 1700 pm  Total Billable Time: 30 minutes    Precautions: Standard    Subjective     Pt reports: Feels good today, continues with no pain or symptoms. Discussed last day of therapy today, patient agreeable to this. Feels they are prepared to be done.     She was compliant with home exercise program.  Response to previous treatment: increase muscle strength  Functional change:  None    Pain: 0/10  Location: right neck      Objective     BOLD = Performed Today  + = New Exercise or Progression    Rob received therapeutic exercises to develop strength, endurance, ROM, flexibility and posture for 40 minutes including:      Exercise Resistance Sets/Reps/Hold    UBE  8 mins     Chin Tucks w/ ANA towel  20x 5 secs    UT, Lev Scap Stretches  3 x 30"     S/L Thoracic ROT stretch  15x B    Scaption 3lbs 3x 10    Row on Matrix 40lbs 3x 10    Supine HABD w/ TB on 1/2 FR Red 3x 10    Shoulder extension on CC 7lbs 3 x 10     Seated no moneys Red 3 x 10     Wall slide w/lift off   YTB 2 x 10     Supine shoulder flexion 1/2 FR 3# dowel  3 x 10     Push up plus-edge of mat  2 x 10     Corner stretch   10 x 10"     Prone Y  2 x 10     Prone T  2 x 10     Prone W  2 x 10     Prone I  2 x 10     Bench Row 10lbs 2x10    Hooklying Unilateral Y's  5 secsx 20    Planks on " forearms  3x25 secs                           Rob received the following manual therapy techniques: Joint mobilizations, Manual traction, Myofacial release and Soft tissue Mobilization were applied to the: cervical musculature for 0 minutes, including IASTM.      Rob received hot pack for 10 minutes to cervical spine.    Home Exercises Provided and Patient Education Provided     Education provided:   - Exercise progressions, postural correction    Written Home Exercises Provided: Patient instructed to cont prior HEP.  Exercises were reviewed and Rob was able to demonstrate them prior to the end of the session.  Rob demonstrated good  understanding of the education provided.     See EMR under Patient Instructions for exercises provided prior visit.    Assessment       Rob is progressing well towards her goals. Patient agreeable to last day of therapies. Patient educated on continuing with HEP outside of sessions and maintaining progress made. Patient agreeable. FOTO completed for last day of therapies     Pt prognosis is Excellent.     Pt will continue to benefit from skilled outpatient physical therapy to address the deficits listed in the problem list box on initial evaluation, provide pt/family education and to maximize pt's level of independence in the home and community environment.     Pt's spiritual, cultural and educational needs considered and pt agreeable to plan of care and goals.     Anticipated barriers to physical therapy: None    Goals:  Short Term GOALS: 2 weeks. Pt agrees with goals set.  1. Patient demonstrates independence with HEP.   2. Patient demonstrates independence with Postural Awareness.   3. Patient demonstrates independence with body mechanics.   4. Patient will report pain of 2/10 at worst, on 0-10 pain scale, with all activity     Long Term GOALS: 4 weeks. Pt agrees with goals set.  1. Patient reports sleeping through the night without limitations d/t  cervical pain for at least 7 consecutive days.    2. Patient demonstrates ability to lift at least 25lbs with good mechanics and no pain in cervical region.    3. Patient reports ability to participate in all prayer activities without limitations d/t cervical pain.  4. Patient will report pain of 1/10 at worst, on 0-10 pain scale, with all activity      Plan     Assess response, continue w/ POC.      Richard Vaughn, PTA   04/17/2019

## 2019-05-31 ENCOUNTER — HOSPITAL ENCOUNTER (OUTPATIENT)
Dept: RADIOLOGY | Facility: HOSPITAL | Age: 39
Discharge: HOME | End: 2019-05-31
Attending: PODIATRIST
Payer: MEDICAID

## 2019-05-31 DIAGNOSIS — M79.674 PAIN IN TOE OF RIGHT FOOT: Primary | ICD-10-CM

## 2019-05-31 DIAGNOSIS — M79.674 PAIN IN TOE OF RIGHT FOOT: ICD-10-CM

## 2019-05-31 PROCEDURE — 73630 XR FOOT COMPLETE 3 VIEW RIGHT: ICD-10-PCS | Mod: 26,RT,, | Performed by: RADIOLOGY

## 2019-05-31 PROCEDURE — 73630 X-RAY EXAM OF FOOT: CPT | Mod: 26,RT,, | Performed by: RADIOLOGY

## 2019-05-31 PROCEDURE — 73630 X-RAY EXAM OF FOOT: CPT | Mod: TC,FY,RT

## 2020-04-17 DIAGNOSIS — M54.6 PAIN IN THORACIC SPINE: Primary | ICD-10-CM

## 2020-05-07 ENCOUNTER — DOCUMENTATION ONLY (OUTPATIENT)
Dept: REHABILITATION | Facility: HOSPITAL | Age: 40
End: 2020-05-07

## 2020-05-07 NOTE — PROGRESS NOTES
Pt did not show up for physical therapy initial evaluation on 5/7/2020 at 3pm.     Shashi Felix, PT, DPT

## 2020-05-19 ENCOUNTER — CLINICAL SUPPORT (OUTPATIENT)
Dept: REHABILITATION | Facility: HOSPITAL | Age: 40
End: 2020-05-19
Payer: MEDICAID

## 2020-05-19 DIAGNOSIS — M53.3 COCCYX PAIN: ICD-10-CM

## 2020-05-19 DIAGNOSIS — R52 PAIN AGGRAVATED BY SITTING: ICD-10-CM

## 2020-05-19 DIAGNOSIS — R52 PAIN AND TENDERNESS: ICD-10-CM

## 2020-05-19 PROCEDURE — 97162 PT EVAL MOD COMPLEX 30 MIN: CPT | Mod: PN

## 2020-05-19 PROCEDURE — 97110 THERAPEUTIC EXERCISES: CPT | Mod: PN
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
## 2020-05-21 ENCOUNTER — CLINICAL SUPPORT (OUTPATIENT)
Dept: REHABILITATION | Facility: HOSPITAL | Age: 40
End: 2020-05-21
Payer: MEDICAID

## 2020-05-21 DIAGNOSIS — R52 PAIN AGGRAVATED BY SITTING: Primary | ICD-10-CM

## 2020-05-21 DIAGNOSIS — R52 PAIN AND TENDERNESS: ICD-10-CM

## 2020-05-21 DIAGNOSIS — M53.3 COCCYX PAIN: ICD-10-CM

## 2020-05-21 PROCEDURE — 97110 THERAPEUTIC EXERCISES: CPT | Mod: PN

## 2020-05-21 NOTE — PROGRESS NOTES
Physical Therapy Daily Treatment Note     Name: Rob Lewis [place]  Clinic Number: [id number]    Therapy Diagnosis:   Encounter Diagnoses   Name Primary?    Coccyx pain     Pain and tenderness     Pain aggravated by sitting Yes     Physician: Federico Pina*    Visit Date: 5/21/2020    Physician Orders: PT Eval and Treat   Medical Diagnosis from Referral: Dorsalgia  Evaluation Date: 5/19/2020  Authorization Period Expiration: 6/20/2020  Plan of Care Expiration: 5/31/2020  Visit # / Visits authorized: 1/ 1    Time In: 1:56p  Time Out: 2:43p   Total Billable Time: 47 minutes    Precautions: Standard and L hip injury     Subjective     Pt reports: she is feeling good and feeling blessed.  She was compliant with home exercise program.  Response to previous treatment: good   Functional change: none     Pain: 1/10  Location: center mid back; Low back coccyx region    Objective     Rob received therapeutic exercises to develop strength, endurance, ROM, flexibility, posture and core stabilization for 43 minutes including:    Piriformis/glute stretch 3x30 sec ea   Standing hip extension, YTB 3x10   Standing hip abduction, YTB 3x10   Standing fire hydrants, YTB 3x10   PALOF Press 7# cable, 2x10 with 10 sec hold at end   6 in step ups with knee drive x30 ea   Goblet Squats with 10# KB x15   Cat cow x15   Happy Baby 3x30 sec   Open books x15 ea     Home Exercises Provided and Patient Education Provided     Education provided:   - discussed muscle fiber connections to coccyx and how they can play a role   - preventing pain when sitting     Written Home Exercises Provided: Patient instructed to cont prior HEP.  Exercises were reviewed and Rob was able to demonstrate them prior to the end of the session.  Rob demonstrated good  understanding of the education provided.     See EMR under Patient Instructions for exercises provided 5/19/2020.    Assessment     PT educated patient on pelvic floor  therapy and its uses with her particular case. Pt did not feel interested at this time to have an internal assessment performed. PT performed external assessment of coccyx mobility, OI, and ability to contract pelvic floor muscles. She demonstrated overuse of glute muscles during pelvic floor contraction. Pt educated on ensuring full relaxation and decrease overuse of accessory muscles during kegels. PT incorporated hip strengthening and relaxation techniques today and pt had good response and tolerance to all given. Pt is appropriate to continue with physical therapy and continue with POC at this time.     Rob is progressing well towards her goals.   Pt prognosis is Fair.     Pt will continue to benefit from skilled outpatient physical therapy to address the deficits listed in the problem list box on initial evaluation, provide pt/family education and to maximize pt's level of independence in the home and community environment.   Pt's spiritual, cultural and educational needs considered and pt agreeable to plan of care and goals.     Anticipated barriers to physical therapy: none     Goals:  Short Term Goals:4 weeks   Pt able to tolerate moderate pressure to Coccyx region with minimal pain  Pt able to sit for 30 min without assistive device or sgnificant discomfort.  Pt reports pain at worst 5/10.     Long Term Goals: 4 weeks   Pt able to tolerate deep pressure to Coccyx region with minimal pain  Pt able to sit for 60 min without assistive device or sgnificant discomfort.  Pt reports pain at worst 2/10.    Plan     Continue with pelvic floor and hip strengthening and relaxation     Shanita Arceo, PT   05/21/2020

## 2020-05-26 ENCOUNTER — CLINICAL SUPPORT (OUTPATIENT)
Dept: REHABILITATION | Facility: HOSPITAL | Age: 40
End: 2020-05-26
Payer: MEDICAID

## 2020-05-26 DIAGNOSIS — M53.3 COCCYX PAIN: ICD-10-CM

## 2020-05-26 DIAGNOSIS — R52 PAIN AND TENDERNESS: ICD-10-CM

## 2020-05-26 DIAGNOSIS — R52 PAIN AGGRAVATED BY SITTING: Primary | ICD-10-CM

## 2020-05-26 PROCEDURE — 97110 THERAPEUTIC EXERCISES: CPT | Mod: PN

## 2020-05-26 NOTE — PROGRESS NOTES
Physical Therapy Daily Treatment Note     Name: Rob Lewis [place]  Clinic Number: [id number]    Therapy Diagnosis:   Encounter Diagnoses   Name Primary?    Coccyx pain     Pain and tenderness     Pain aggravated by sitting Yes     Physician: Federico Pina*    Visit Date: 5/26/2020    Physician Orders: PT Eval and Treat   Medical Diagnosis from Referral: Dorsalgia  Evaluation Date: 5/19/2020  Authorization Period Expiration: 6/20/2020  Plan of Care Expiration: 5/31/2020  Visit # / Visits authorized:3    Time In: 10am  Time Out: 1050  Total Billable Time: 50 minutes    Precautions: Standard and L hip injury     Subjective     Pt reports: she is feeling good today.  Pt states when she has pressure  She was compliant with home exercise program.  Response to previous treatment: good   Functional change: none     Pain: 1/10  Location: center mid back; Low back coccyx region    Objective     Rob received therapeutic exercises to develop strength, endurance, ROM, flexibility, posture and core stabilization for 50 minutes including:    Piriformis/glute stretch 3x30 sec ea   Standing hip extension, YTB 3x10 , inc to RTB visit 4  Standing hip abduction, YTB 3x10 inc to RTB visit 4  Standing fire hydrants, YTB 3x10   PALOF Press 7# cable, 2x10 with 10 sec hold at end   6 in step ups with knee drive x30 ea   Goblet Squats with 15# KB x15   Cat cow x15   Happy Baby 3x30 sec   Open books x15 ea   Seated flexion 5 x10  Bosu squats 20x  Side steps with RTB c/ maintaining squat-  Home Exercises Provided and Patient Education Provided     Education provided:   - discussed muscle fiber connections to coccyx and how they can play a role   - preventing pain when sitting     Written Home Exercises Provided: Patient instructed to cont prior HEP.  Exercises were reviewed and Rob was able to demonstrate them prior to the end of the session.  Rob demonstrated good  understanding of the education  provided.     See EMR under Patient Instructions for exercises provided 5/19/2020.    Assessment   Pt arrives today in good spirits and reports she has been remaining active and performing exercise at home. Added Bosu squats and side step with TB with pt completing without difficulty.  Continue to work on stabilization of trunk/hip musculature. Pt required frequent verbal cueing to attend to task.  Rob is progressing well towards her goals.   Pt prognosis is Fair.     Pt will continue to benefit from skilled outpatient physical therapy to address the deficits listed in the problem list box on initial evaluation, provide pt/family education and to maximize pt's level of independence in the home and community environment.   Pt's spiritual, cultural and educational needs considered and pt agreeable to plan of care and goals.     Anticipated barriers to physical therapy: none     Goals:  Short Term Goals:4 weeks   Pt able to tolerate moderate pressure to Coccyx region with minimal pain  Pt able to sit for 30 min without assistive device or sgnificant discomfort.  Pt reports pain at worst 5/10.     Long Term Goals: 4 weeks   Pt able to tolerate deep pressure to Coccyx region with minimal pain  Pt able to sit for 60 min without assistive device or sgnificant discomfort.  Pt reports pain at worst 2/10.    Plan     Continue with pelvic floor and hip strengthening and relaxation     Livier Luevano, PT   05/26/2020

## 2020-06-02 ENCOUNTER — CLINICAL SUPPORT (OUTPATIENT)
Dept: REHABILITATION | Facility: HOSPITAL | Age: 40
End: 2020-06-02
Payer: MEDICAID

## 2020-06-02 DIAGNOSIS — R52 PAIN AND TENDERNESS: ICD-10-CM

## 2020-06-02 DIAGNOSIS — R52 PAIN AGGRAVATED BY SITTING: Primary | ICD-10-CM

## 2020-06-02 DIAGNOSIS — M53.3 COCCYX PAIN: ICD-10-CM

## 2020-06-02 PROCEDURE — 97110 THERAPEUTIC EXERCISES: CPT | Mod: PN

## 2020-06-02 NOTE — PROGRESS NOTES
Physical Therapy Daily Treatment Note     Name: Rob Lewis [place]  Clinic Number: [id number]    Therapy Diagnosis:   No diagnosis found.  Physician: Federico Pina*    Visit Date: 6/2/2020    Physician Orders: PT Eval and Treat   Medical Diagnosis from Referral: Dorsalgia  Evaluation Date: 5/19/2020  Authorization Period Expiration: 6/20/2020  Plan of Care Expiration: 5/31/2020  Visit # / Visits authorized:4    Time In: 10am  Time Out: 1055  Total Billable Time: 45 minutes    Precautions: Standard and L hip injury     Subjective     Pt reports: she is feeling good today.  Pt reports she only feels her pain when she sits on the floor  She was compliant with home exercise program.  Response to previous treatment: good   Functional change: none     Pain: 0/10  Location: center mid back; Low back coccyx region    Objective     Rob received therapeutic exercises to develop strength, endurance, ROM, flexibility, posture and core stabilization for 55 minutes including:    Piriformis/glute stretch 3x30 sec ea   Standing hip extension, GTB 30x  Standing hip abduction, GTB 30x  Standing fire hydrants, YTB 3x10   PALOF step outs GTB 10x  12 in step ups with knee drive x30 ea   Goblet Squats with 15# KB x15   Cat cow x15   Happy Baby 3x30 sec   Open books x15 ea   Seated flexion 5 x10  Bosu squats 20x  All fours stretch 5 x10  Side steps with GTB c/ maintaining squat-2x  SL clams 10x  Dying bugs 2x10  Alt UE/LE ext 10x  Seated flex with ball 5x 10 sec hold  SLS on foam 3x each leg x 10 seconds  Home Exercises Provided and Patient Education Provided     Education provided:   - discussed muscle fiber connections to coccyx and how they can play a role   - preventing pain when sitting     Written Home Exercises Provided: Patient instructed to cont prior HEP.  Exercises were reviewed and Rob was able to demonstrate them prior to the end of the session.  Ngocnljosse demonstrated good  understanding of the  education provided.     See EMR under Patient Instructions for exercises provided 5/19/2020.    Assessment   Pt arrives today and reports compliance with HEP.  Added exercises bolded below with pt demonstrating good tolerance.  Increased reps and intensity of exercises to challenge pt further.  Continue to work on stabilization of trunk/hip musculature. Reviewed importance of trunk stability and muscle attachments to coccyx.  Rob is progressing well towards her goals.   Pt prognosis is Fair.     Pt will continue to benefit from skilled outpatient physical therapy to address the deficits listed in the problem list box on initial evaluation, provide pt/family education and to maximize pt's level of independence in the home and community environment.   Pt's spiritual, cultural and educational needs considered and pt agreeable to plan of care and goals.     Anticipated barriers to physical therapy: none     Goals:  Short Term Goals:4 weeks   Pt able to tolerate moderate pressure to Coccyx region with minimal pain  Pt able to sit for 30 min without assistive device or sgnificant discomfort.  Pt reports pain at worst 5/10.     Long Term Goals: 4 weeks   Pt able to tolerate deep pressure to Coccyx region with minimal pain  Pt able to sit for 60 min without assistive device or sgnificant discomfort.  Pt reports pain at worst 2/10.    Plan     Continue with pelvic floor and hip strengthening and relaxation     Livier Luevano, PT   06/02/2020

## 2020-06-05 ENCOUNTER — CLINICAL SUPPORT (OUTPATIENT)
Dept: REHABILITATION | Facility: HOSPITAL | Age: 40
End: 2020-06-05
Payer: MEDICAID

## 2020-06-05 DIAGNOSIS — R52 PAIN AGGRAVATED BY SITTING: Primary | ICD-10-CM

## 2020-06-05 DIAGNOSIS — R52 PAIN AND TENDERNESS: ICD-10-CM

## 2020-06-05 DIAGNOSIS — M53.3 COCCYX PAIN: ICD-10-CM

## 2020-06-05 PROCEDURE — 97110 THERAPEUTIC EXERCISES: CPT | Mod: PN

## 2020-06-05 NOTE — PROGRESS NOTES
Physical Therapy Daily Treatment Note     Name: Rob Lewis [place]  Clinic Number: [id number]    Therapy Diagnosis:   Encounter Diagnoses   Name Primary?    Coccyx pain     Pain and tenderness     Pain aggravated by sitting Yes     Physician: Federico Pina*    Visit Date: 6/5/2020    Physician Orders: PT Eval and Treat   Medical Diagnosis from Referral: Dorsalgia  Evaluation Date: 5/19/2020  Authorization Period Expiration: 6/20/2020  Plan of Care Expiration: 5/31/2020  Visit # / Visits authorized: 5    Time In: 9:56a  Time Out: 10:30a  Total Billable Time: 34 minutes    Precautions: Standard and L hip injury     Subjective     Pt reports: she is feeling good today.  Pt reports she only feels her pain when she sits on the floor  She was compliant with home exercise program.  Response to previous treatment: good   Functional change: none     Pain: 0/10  Location: center mid back; Low back coccyx region    Objective     Rob received therapeutic exercises to develop strength, endurance, ROM, flexibility, posture and core stabilization for 34 minutes including:    Piriformis/glute stretch 2x30 sec ea   Standing hip extension, GTB 30x - perform shuttle standing hip extension next session   Standing hip abduction, GTB 30x  Standing fire hydrants, YTB 3x10   PALOF step outs GTB 10x  12 in step ups with knee drive x30 ea   Goblet Squats with 15# KB x15   Cat cow x15   Happy Baby 3x30 sec   Open books x15 ea   +Glute med lifts on 4 in step x15   Seated flexion 5 x10  Bosu squats 20x  All fours stretch 5 x10  Side steps with GTB c/ maintaining squat-2x  SL clams, YTB 2x10  Dying bugs 2x10  +Reverse crunch with 4# med ball x10  Alt UE/LE ext 10x  Seated flex with ball 5x 10 sec hold  SLS on foam 3x each leg x 10 seconds    Home Exercises Provided and Patient Education Provided     Education provided:   - continue with HEP and stretches at home     Written Home Exercises Provided: Patient instructed to  cont prior HEP.  Exercises were reviewed and Rob was able to demonstrate them prior to the end of the session.  Rob demonstrated good  understanding of the education provided.     See EMR under Patient Instructions for exercises provided 5/19/2020.    Assessment   Pt tolerated therapy session well. She had increased sacrum/coccyx pain noted during reverse crunches. Pt demonstrates decreased core strength/endurance and noted anterior pelvic tilt during core exercises - cued to improve abdominal bracing and posterior pelvic tilt. Pt is very compliant with exercises at home and is progressing well. Continue with POC and progress as able.     Rob is progressing well towards her goals.   Pt prognosis is Fair.     Pt will continue to benefit from skilled outpatient physical therapy to address the deficits listed in the problem list box on initial evaluation, provide pt/family education and to maximize pt's level of independence in the home and community environment.   Pt's spiritual, cultural and educational needs considered and pt agreeable to plan of care and goals.     Anticipated barriers to physical therapy: none     Goals:  Short Term Goals:4 weeks ongoing, in progress   Pt able to tolerate moderate pressure to Coccyx region with minimal pain  Pt able to sit for 30 min without assistive device or sgnificant discomfort.  Pt reports pain at worst 5/10.     Long Term Goals: 4 weeks ongoing, in progress   Pt able to tolerate deep pressure to Coccyx region with minimal pain  Pt able to sit for 60 min without assistive device or sgnificant discomfort.  Pt reports pain at worst 2/10.    Plan     Continue with pelvic floor and hip strengthening and relaxation     Shanita Arceo, PT   06/05/2020

## 2020-06-09 ENCOUNTER — CLINICAL SUPPORT (OUTPATIENT)
Dept: REHABILITATION | Facility: HOSPITAL | Age: 40
End: 2020-06-09
Payer: MEDICAID

## 2020-06-09 DIAGNOSIS — R52 PAIN AND TENDERNESS: ICD-10-CM

## 2020-06-09 DIAGNOSIS — R52 PAIN AGGRAVATED BY SITTING: Primary | ICD-10-CM

## 2020-06-09 DIAGNOSIS — M53.3 COCCYX PAIN: ICD-10-CM

## 2020-06-09 PROCEDURE — 97110 THERAPEUTIC EXERCISES: CPT | Mod: PN

## 2020-06-09 NOTE — PROGRESS NOTES
Physical Therapy Daily Treatment Note     Name: Rob Lewis [place]  Clinic Number: [id number]    Therapy Diagnosis:   Encounter Diagnoses   Name Primary?    Coccyx pain     Pain and tenderness     Pain aggravated by sitting Yes     Physician: Federico Pina*    Visit Date: 6/9/2020    Physician Orders: PT Eval and Treat   Medical Diagnosis from Referral: Dorsalgia  Evaluation Date: 5/19/2020   Progress note due: 6/19/2020  Authorization Period Expiration: 6/20/2020  Plan of Care Expiration: 5/31/2020  Visit # / Visits authorized: 4/20    Time In: 10:38a  Time Out: 11:14a  Total Billable Time: 41 minutes    Precautions: Standard and L hip injury     Subjective     Pt reports: she is feeling good overall. She states she started having pain during intercourse due to tailbone pain - she feels like this has not changed since starting physical therapy. She states her hips are pretty sore and this may be from all of the exercises she does. She states she already exercised in the morning and she is going to exercise here and then when she goes home she will most likely exercise again because it is a stress reliever. She states she has a lot of stress right now in her personal and business and home life. She doesn't feel that anything is changing with her tailbone pain.   She was compliant with home exercise program.  Response to previous treatment: good   Functional change: none     Pain: 0/10  Location: center mid back; Low back coccyx region    Objective     Rob received therapeutic exercises to develop strength, endurance, ROM, flexibility, posture and core stabilization for 41 minutes including:    Diaphragmatic breathing x6 min   Piriformis/glute stretch 2x30 sec ea   +Shuttle hip extension, Red cord 20x   Cat cow x15   Happy Baby 3x30 sec   +[place] stretch on mat 2x45 sec   Open books x15 ea   All fours stretch 5 x10  Alt UE/LE ext 10x  Seated flex with ball 5x 10 sec hold  Standing hip  "abduction, GTB 30x  Standing fire hydrants, YTB 3x10   PALOF step outs GTB 10x  12 in step ups with knee drive x30 ea   Goblet Squats with 15# KB x15   Side steps with GTB c/ maintaining squat-2x  SL clams, YTB 2x10  Dying bugs 2x10  Reverse crunch with 4# med ball x10  Glute med lifts on 4 in step x15   Bosu squats 20x    Home Exercises Provided and Patient Education Provided     Education provided:   - perform diaphragmatic breathing and stretches and decrease strengthening for now until hips "calm down"     Written Home Exercises Provided: Patient instructed to cont prior HEP. - given meditation resources   Exercises were reviewed and Rob was able to demonstrate them prior to the end of the session.  Rob demonstrated good  understanding of the education provided.     See EMR under Patient Instructions for exercises provided 6/9/2020.    Assessment   Pt tolerated therapy session fairly well. Her symptoms have not decreased significantly since start of care. She has increased pain during intercourse in her coccyx region and she demonstrates increased muscle tightness and overactivity. She had difficulty performing deep diaphragmatic breathing due to decreased abdominal wall movement and increased tension. Pt given meditation resources for stress relief, muscle tightness, and to bring increased awareness to pelvic floor. PT focused on stretching of hip muscles with breathing for down-training effect. Reassess next session for compliance and improved symptoms. Continue with POC and progress as able.     Rob is progressing well towards her goals.   Pt prognosis is Fair.     Pt will continue to benefit from skilled outpatient physical therapy to address the deficits listed in the problem list box on initial evaluation, provide pt/family education and to maximize pt's level of independence in the home and community environment.   Pt's spiritual, cultural and educational needs considered and pt " agreeable to plan of care and goals.     Anticipated barriers to physical therapy: none     Goals:  Short Term Goals:4 weeks ongoing, in progress   Pt able to tolerate moderate pressure to Coccyx region with minimal pain  Pt able to sit for 30 min without assistive device or sgnificant discomfort.  Pt reports pain at worst 5/10.     Long Term Goals: 4 weeks ongoing, in progress   Pt able to tolerate deep pressure to Coccyx region with minimal pain  Pt able to sit for 60 min without assistive device or sgnificant discomfort.  Pt reports pain at worst 2/10.    Plan     Continue with pelvic floor and hip strengthening and relaxation     Shanita Arceo, PT   06/09/2020

## 2020-06-15 NOTE — PROGRESS NOTES
Physical Therapy Daily Treatment Note     Name: Rob Lewis [facility] Number: [id number]    Therapy Diagnosis:   Encounter Diagnoses   Name Primary?    Coccyx pain     Pain and tenderness     Pain aggravated by sitting Yes     Physician: Federico Pina*    Visit Date: 6/16/2020    Physician Orders: PT Eval and Treat   Medical Diagnosis from Referral: Dorsalgia  Evaluation Date: 5/19/2020   Progress note due: 6/19/2020  Authorization Period Expiration: 6/20/2020  Plan of Care Expiration: 5/31/2020  Visit # / Visits authorized: 5/20    Time In: 10:32a  Time Out: 11:13a  Total Billable Time: 41 minutes    Precautions: Standard and L hip injury     Subjective     Pt reports: she is feeling good overall. She states she started having pain during intercourse due to tailbone pain - she feels like this has not changed since starting physical therapy. She states her hips are pretty sore and this may be from all of the exercises she does. She states she already exercised in the morning and she is going to exercise here and then when she goes home she will most likely exercise again because it is a stress reliever. She states she has a lot of stress right now in her personal and business and home life. She doesn't feel that anything is changing with her tailbone pain.   She was compliant with home exercise program.  Response to previous treatment: good   Functional change: none     Pain: 0/10  Location: center mid back; Low back coccyx region    Objective   Progress note:   Palpation: ttp over coccyx externally   Ability to sit for 30 minutes: no about 5-10 minutes   Pain at worse: 9/10  Hip ROM and flexibility: decreased external rotation, increased and excessive internal rotation; tight internal rotators     CMS Impairment/Limitation/Restriction for FOTO Lumbar Spine Survey  Status Limitation G-Code CMS Severity Modifier  Intake 72% 28%  Predicted 71% 29% Goal Status+ CJ - At least 20 percent but less  than 40 percent  6/16/2020 94% 6% Current Status CI - At least 1 percent but less than 20 percent     Rob received therapeutic exercises to develop strength, endurance, ROM, flexibility, posture and core stabilization for 41 minutes including:    Diaphragmatic breathing x6 min   Piriformis/glute stretch 3x30 sec ea   Shuttle hip extension, Red cord 20x   +Deep squat stretch 2x1 min   Cat cow x15   Happy Baby 3x30 sec   [place] stretch on mat 3x45 sec   Open books x15 ea   Child's pose all directions 3x30 sec ea  SL clams, RTB 2x10  Alt UE/LE ext 10x  Seated flex with ball 5x 10 sec hold  Standing hip abduction, GTB 30x  PALOF step outs GTB 10x  12 in step ups with knee drive x30 ea   Goblet Squats with 15# KB x15   Side steps with GTB c/ maintaining squat-2x  Dying bugs 2x10    Home Exercises Provided and Patient Education Provided     Education provided:   - continue with relaxation and stretching techniques throughout day     Written Home Exercises Provided: Patient instructed to cont prior HEP. - given meditation resources   Exercises were reviewed and Rob was able to demonstrate them prior to the end of the session.  Rob demonstrated good  understanding of the education provided.     See EMR under Patient Instructions for exercises provided 6/9/2020.    Assessment   Pt tolerated therapy session well. A progress note was performed today. She has met no goals thus far, but is steadily progressing to her short term and long term goals as expected. She continues with significant pain over coccyx when sitting, having intercourse, and touching it. She demonstrates overactive glute muscles and would benefit from stretching and relaxation techniques. When performed here, she has good response. She demonstrates decreased core strength and endurance and fatigues quickly with core strengthening exercises. Pt is appropriate to continue with physical therapy at this time. Continue with POC and progress as  modesta Rivas is progressing well towards her goals.   Pt prognosis is Fair.     Pt will continue to benefit from skilled outpatient physical therapy to address the deficits listed in the problem list box on initial evaluation, provide pt/family education and to maximize pt's level of independence in the home and community environment.   Pt's spiritual, cultural and educational needs considered and pt agreeable to plan of care and goals.     Anticipated barriers to physical therapy: none     Goals:  Short Term Goals:4 weeks   Pt able to tolerate moderate pressure to Coccyx region with minimal pain. Goal not met, continue  Pt able to sit for 30 min without assistive device or sgnificant discomfort. Goal not met, continue   Pt reports pain at worst 5/10. Goal not met, continue      Long Term Goals: 4 weeks ongoing, in progress   Pt able to tolerate deep pressure to Coccyx region with minimal pain  Pt able to sit for 60 min without assistive device or sgnificant discomfort.  Pt reports pain at worst 2/10.    Plan     Continue with pelvic floor and hip strengthening and relaxation     Shanita Arceo, PT   06/16/2020

## 2020-06-16 ENCOUNTER — CLINICAL SUPPORT (OUTPATIENT)
Dept: REHABILITATION | Facility: HOSPITAL | Age: 40
End: 2020-06-16
Payer: MEDICAID

## 2020-06-16 DIAGNOSIS — R52 PAIN AND TENDERNESS: ICD-10-CM

## 2020-06-16 DIAGNOSIS — M53.3 COCCYX PAIN: ICD-10-CM

## 2020-06-16 DIAGNOSIS — R52 PAIN AGGRAVATED BY SITTING: Primary | ICD-10-CM

## 2020-06-16 PROCEDURE — 97110 THERAPEUTIC EXERCISES: CPT | Mod: PN

## 2020-06-19 ENCOUNTER — CLINICAL SUPPORT (OUTPATIENT)
Dept: REHABILITATION | Facility: HOSPITAL | Age: 40
End: 2020-06-19
Payer: MEDICAID

## 2020-06-19 DIAGNOSIS — R52 PAIN AGGRAVATED BY SITTING: Primary | ICD-10-CM

## 2020-06-19 DIAGNOSIS — R52 PAIN AND TENDERNESS: ICD-10-CM

## 2020-06-19 DIAGNOSIS — M53.3 COCCYX PAIN: ICD-10-CM

## 2020-06-19 PROCEDURE — 97110 THERAPEUTIC EXERCISES: CPT | Mod: PN

## 2020-06-19 NOTE — PROGRESS NOTES
Physical Therapy Daily Treatment Note     Name: Rob Lewis [place]  Clinic Number: [id number]    Therapy Diagnosis:   Encounter Diagnoses   Name Primary?    Coccyx pain     Pain and tenderness     Pain aggravated by sitting Yes       Physician: Federico Pina*    Visit Date: 6/19/2020    Physician Orders: PT Eval and Treat   Medical Diagnosis from Referral: Dorsalgia  Evaluation Date: 5/19/2020   Progress note due: 6/19/2020  Authorization Period Expiration: 6/20/2020  Plan of Care Expiration: 5/31/2020  Visit # / Visits authorized: 6/20    Time In: 10:22 am (pt arrived late today)  Time Out: 11:08 am   Total Billable Time: 46 minutes    Precautions: Standard and L hip injury     Subjective     Pt reports: Continues to report pain in coccyx after sitting for 7 minutes. Avoids sitting for periods of time due to pain and difficulty staying in one position for too long secondary to ADHD per pt report.   She was compliant with home exercise program.  Response to previous treatment: good   Functional change: none     Pain: 0/10  Location: center mid back; Low back coccyx region    Objective     Rob received therapeutic exercises to develop strength, endurance, ROM, flexibility, posture and core stabilization for 46 minutes including:    Diaphragmatic breathing x6 min    Piriformis/glute stretch 3x30 sec ea   Shuttle hip extension, Red cord 20x   Deep squat stretch 2x1 min   Cat cow x15   Happy Baby 3x30 sec   [place] stretch on mat 3x45 sec   Open books x15 ea   Child's pose all directions 3x30 sec ea  SL clams, RTB 2x10  Alt UE/LE ext 15x  Seated flex with ball 5x 10 sec hold  Standing hip abduction, GTB 30x  PALOF press at Free motion 2 x 10, 17#  +[place] with stirring motion 2 x 10 each, 17#  12 in step ups with knee drive x30 ea   Goblet Squats with 15# KB x15   Side steps with GTB c/ maintaining squat-2x  Dying bugs 2x10    Home Exercises Provided and Patient Education Provided     Education  provided:   - continue with relaxation and stretching techniques throughout day     Written Home Exercises Provided: Patient instructed to cont prior HEP. - given meditation resources   Exercises were reviewed and Rob was able to demonstrate them prior to the end of the session.  Rob demonstrated good  understanding of the education provided.     See EMR under Patient Instructions for exercises provided 6/9/2020.    Assessment   Pt tolerated therapy session well. Reports some soreness in L paraspinal after standing lumbar and core exercises. Continues to report difficulty sitting after about 7 minutes, pain increases in coccyx. Progressed strengthening with good tolerance. Pt is appropriate to continue with physical therapy at this time. Continue with POC and progress as able.     Rob is progressing well towards her goals.   Pt prognosis is Fair.     Pt will continue to benefit from skilled outpatient physical therapy to address the deficits listed in the problem list box on initial evaluation, provide pt/family education and to maximize pt's level of independence in the home and community environment.   Pt's spiritual, cultural and educational needs considered and pt agreeable to plan of care and goals.     Anticipated barriers to physical therapy: none     Goals:  Short Term Goals:4 weeks   Pt able to tolerate moderate pressure to Coccyx region with minimal pain. Goal not met, continue  Pt able to sit for 30 min without assistive device or sgnificant discomfort. Goal not met, continue   Pt reports pain at worst 5/10. Goal not met, continue      Long Term Goals: 4 weeks ongoing, in progress   Pt able to tolerate deep pressure to Coccyx region with minimal pain  Pt able to sit for 60 min without assistive device or sgnificant discomfort.  Pt reports pain at worst 2/10.    Plan     Continue with pelvic floor and hip strengthening and relaxation     Natalie Barrientos, PT   06/19/2020

## 2020-07-06 ENCOUNTER — CLINICAL SUPPORT (OUTPATIENT)
Dept: REHABILITATION | Facility: HOSPITAL | Age: 40
End: 2020-07-06
Payer: MEDICAID

## 2020-07-06 DIAGNOSIS — R52 PAIN AND TENDERNESS: ICD-10-CM

## 2020-07-06 DIAGNOSIS — M53.3 COCCYX PAIN: ICD-10-CM

## 2020-07-06 DIAGNOSIS — R52 PAIN AGGRAVATED BY SITTING: Primary | ICD-10-CM

## 2020-07-06 PROCEDURE — 97110 THERAPEUTIC EXERCISES: CPT | Mod: PN

## 2020-07-06 NOTE — PROGRESS NOTES
Physical Therapy Daily Treatment Note     Name: Rob Lewis [place]  Clinic Number: [id number]    Therapy Diagnosis:   Encounter Diagnoses   Name Primary?    Coccyx pain     Pain and tenderness     Pain aggravated by sitting Yes     Physician: Federico Pina*    Visit Date: 7/6/2020    Physician Orders: PT Eval and Treat   Medical Diagnosis from Referral: Dorsalgia  Evaluation Date: 5/19/2020   Progress note due: 7/19/2020  Authorization Period Expiration: 6/20/2020  Plan of Care Expiration: 5/31/2020  Visit # / Visits authorized: 7/20    Time In: 11:15 am   Time Out: 12:00 pm   Total Billable Time: 45 minutes    Precautions: Standard and L hip injury     Subjective     Pt reports: Her pain has subsided some with the stretches and exercises but continues to have pain with sitting for > ~5 minutes. Pt reports a lot of stressful family matters happening currently that is possibly effecting pain level.     She was compliant with home exercise program.  Response to previous treatment: good   Functional change: none     Pain: 0/10  Location: center mid back; Low back coccyx region    Objective     Rob received therapeutic exercises to develop strength, endurance, ROM, flexibility, posture and core stabilization for 45 minutes including:    Diaphragmatic breathing x6 min    Piriformis/glute stretch 3x30 sec ea   Shuttle hip extension, Red cord 20x   Deep squat stretch 2x1 min   Cat cow x15   Happy Baby 3x30 sec   [place] stretch on mat 3x45 sec   Open books x15 ea   Child's pose all directions 3x30 sec ea  SL clams, RTB 2x10  Alt UE/LE ext 15x   Seated flex with ball 5x 10 sec hold  Standing hip abduction, GTB 30x  PALOF press at Free motion 2 x 10, 17#  +[place] with stirring motion 2 x 10 each, 17#  12 in step ups with knee drive x30 ea   Goblet Squats with 15# KB x15   Side steps with GTB c/ maintaining squat-2x  Dying bugs 2x10  +seated on large blue PB:    +pelvic tilts x 30   +marches x 30  alt  +Matrix hip abd 45# 2 x 15  +Matrix hip add 55# 2 x 15    Home Exercises Provided and Patient Education Provided     Education provided:   - continue with relaxation and stretching techniques throughout day     Written Home Exercises Provided: Patient instructed to cont prior HEP. - given meditation resources   Exercises were reviewed and Rob was able to demonstrate them prior to the end of the session.  Rob demonstrated good  understanding of the education provided.     See EMR under Patient Instructions for exercises provided 6/9/2020.    Assessment   Pt tolerated therapy session well. Reports some decrease in low back pain overall but continues to have pain over coccyx when sitting for greater than about 5 minutes. Use of pillow when sitting gives her some relief. Good tolerance with lumbar and glute progression of strengthening exercises. Pt is appropriate to continue with physical therapy at this time. Continue with POC and progress as able.     Rob is progressing well towards her goals.   Pt prognosis is Fair.     Pt will continue to benefit from skilled outpatient physical therapy to address the deficits listed in the problem list box on initial evaluation, provide pt/family education and to maximize pt's level of independence in the home and community environment.   Pt's spiritual, cultural and educational needs considered and pt agreeable to plan of care and goals.     Anticipated barriers to physical therapy: none     Goals:  Short Term Goals:4 weeks   Pt able to tolerate moderate pressure to Coccyx region with minimal pain. Goal not met, continue  Pt able to sit for 30 min without assistive device or sgnificant discomfort. Goal not met, continue   Pt reports pain at worst 5/10. Goal not met, continue      Long Term Goals: 4 weeks ongoing, in progress   Pt able to tolerate deep pressure to Coccyx region with minimal pain  Pt able to sit for 60 min without assistive device or  sgnificant discomfort.  Pt reports pain at worst 2/10.    Plan     Continue with pelvic floor and hip strengthening and relaxation     Natalie Barrientos, PT   07/06/2020

## 2020-07-13 ENCOUNTER — CLINICAL SUPPORT (OUTPATIENT)
Dept: REHABILITATION | Facility: HOSPITAL | Age: 40
End: 2020-07-13
Payer: MEDICAID

## 2020-07-13 DIAGNOSIS — R52 PAIN AND TENDERNESS: ICD-10-CM

## 2020-07-13 DIAGNOSIS — R52 PAIN AGGRAVATED BY SITTING: Primary | ICD-10-CM

## 2020-07-13 DIAGNOSIS — M53.3 COCCYX PAIN: ICD-10-CM

## 2020-07-13 PROCEDURE — 97110 THERAPEUTIC EXERCISES: CPT | Mod: PN

## 2020-07-13 NOTE — PROGRESS NOTES
Physical Therapy Daily Treatment Note     Name: Rob Lewis [place]  Clinic Number: [id number]    Therapy Diagnosis:   Encounter Diagnoses   Name Primary?    Coccyx pain     Pain and tenderness     Pain aggravated by sitting Yes     Physician: Federico Pina    Visit Date: 7/13/2020    Physician Orders: PT Eval and Treat   Medical Diagnosis from Referral: Dorsalgia  Evaluation Date: 5/19/2020   Progress note due: 7/19/2020  Authorization Period Expiration: 6/20/2020  Plan of Care Expiration: 5/31/2020  Visit # / Visits authorized: 8/20    Time In: 0730  Time Out: 0827  Total Billable Time: 57 minutes    Precautions: Standard and L hip injury     Subjective     Pt reports: she only has pain with prolonged sitting or direct palpation to coccyx. PT has helped symptoms be less intense. Doing butterfly stretch with yoga slightly bothered symptoms.     She was compliant with home exercise program.  Response to previous treatment: good with no soreness  Functional change: less intense pain with sitting    Pain: 0/10  Location: center mid back; Low back coccyx region    Objective     Rob received therapeutic exercises to develop strength, endurance, ROM, flexibility, posture and core stabilization for 57 minutes including:    Diaphragmatic breathing x6 min    +Shuttle squats 2 bands x 5 min warm up  Shuttle hip extension, Red cord 20x ea  Piriformis/glute stretch 3x30 sec ea  +SL bridge x 20 ea   +Belt-ball 5 sec hold x 20  +SL clams + reverse clam x 20 ea  Deep squat stretch 2x1 min   Cat cow x15   Happy Baby 3x30 sec   [place] stretch on mat 3x30 sec ea   Open books x15 ea   Child's pose all directions 3x30 sec in middle, 1 x 30 sec to ea side  +Short to tall kneeling x 20  SL clams, RTB 2x10  Alt UE/LE ext 15x   Seated flex with ball 5x 10 sec hold  Standing hip abduction x 20 ea  +Standing march x 20 ea  PALOF press at Free motion 2 x 10, 17#  Paloff with stirring motion 2 x 10 each, 17#  12 in  step ups with knee drive x30 ea   Goblet Squats with 15# KB 2 x 10  Side steps with GTB at ankles c/ maintaining squat 40' x 2 laps  Dying bugs 2x10 ea  seated on large blue PB:    +Bouncing x 3 min   pelvic tilts x 30   marches x 30 alt  Matrix hip abd 45# 2 x 15  Matrix hip add 55# 2 x 15    Home Exercises Provided and Patient Education Provided     Education provided:   - continue with relaxation and stretching techniques throughout day     Written Home Exercises Provided: Patient instructed to cont prior HEP. - given meditation resources   Exercises were reviewed and Rob was able to demonstrate them prior to the end of the session.  Rob demonstrated good  understanding of the education provided.     See EMR under Patient Instructions for exercises provided 6/9/2020.    Assessment     Pt had good tolerance to treatment today with no adverse effects. Post-treatment coccyx pain rated as 0/10. No exacerbation of symptoms throughout session. Good response to progression of exercise program. Pt reporting some relief and challenged with dead bugs. Occasional need for verbal cueing for proper technique with therapeutic exercises.     Rob is progressing well towards her goals.   Pt prognosis is Fair.     Pt will continue to benefit from skilled outpatient physical therapy to address the deficits listed in the problem list box on initial evaluation, provide pt/family education and to maximize pt's level of independence in the home and community environment.   Pt's spiritual, cultural and educational needs considered and pt agreeable to plan of care and goals.     Anticipated barriers to physical therapy: none     Goals:  Short Term Goals:4 weeks   Pt able to tolerate moderate pressure to Coccyx region with minimal pain. Goal not met, continue  Pt able to sit for 30 min without assistive device or sgnificant discomfort. Goal not met, continue   Pt reports pain at worst 5/10. Goal not met, continue       Long Term Goals: 4 weeks ongoing, in progress   Pt able to tolerate deep pressure to Coccyx region with minimal pain  Pt able to sit for 60 min without assistive device or sgnificant discomfort.  Pt reports pain at worst 2/10.    Plan     Continue with pelvic floor and hip strengthening and relaxation.    Samantha Simon, PT   07/13/2020

## 2020-07-15 ENCOUNTER — CLINICAL SUPPORT (OUTPATIENT)
Dept: REHABILITATION | Facility: HOSPITAL | Age: 40
End: 2020-07-15
Payer: MEDICAID

## 2020-07-15 DIAGNOSIS — M53.3 COCCYX PAIN: ICD-10-CM

## 2020-07-15 DIAGNOSIS — R52 PAIN AGGRAVATED BY SITTING: Primary | ICD-10-CM

## 2020-07-15 DIAGNOSIS — R52 PAIN AND TENDERNESS: ICD-10-CM

## 2020-07-15 PROCEDURE — 97110 THERAPEUTIC EXERCISES: CPT | Mod: PN

## 2020-07-15 NOTE — PROGRESS NOTES
Physical Therapy Daily Treatment Note     Name: Rob Lewis [place]  Clinic Number: [id number]    Therapy Diagnosis:   Encounter Diagnoses   Name Primary?    Coccyx pain     Pain and tenderness     Pain aggravated by sitting Yes     Physician: Federico Pina*    Visit Date: 7/15/2020    Physician Orders: PT Eval and Treat   Medical Diagnosis from Referral: Dorsalgia  Evaluation Date: 2020   Progress note due: 2020  Authorization Period Expiration: 2020  Plan of Care Expiration: 2020  Visit # / Visits authorized:     Time In: 8:52 am  Time Out: 9:30 am  Total Billable Time: 38 minutes    Precautions: Standard and L hip injury     Subjective     Pt reports: she sat for 4-5 hours the other day while at a  but was not thinking about her low back pain secondary to everything going on.    She was compliant with home exercise program.  Response to previous treatment: good with no soreness  Functional change: less intense pain with sitting    Pain: 0/10  Location: center mid back; Low back coccyx region    Objective     Rob received therapeutic exercises to develop strength, endurance, ROM, flexibility, posture and core stabilization for 38 minutes including:    Diaphragmatic breathing x6 min    Shuttle squats 3 bands x 3 min warm up  Shuttle hip extension, Red cord 20x ea  Piriformis/glute stretch 3x30 sec ea  SL bridge x 20 ea   Belt-ball 5 sec hold x 20  SL clams + reverse clam x 20 ea  Deep squat stretch 2x1 min   Cat cow x15   Happy Baby 3x30 sec   Bland stretch on mat 3x30 sec ea   Open books x15 ea   Child's pose all directions 3x30 sec in middle, 1 x 30 sec to ea side  Short to tall kneeling x 20  SL clams, RTB 2x10  Alt UE/LE ext 15x   Seated flex with ball 5x 10 sec hold  Standing hip abduction x 20 ea  Standing march x 20 ea  PALOF press at Free motion 2 x 10, 17#  Paloff with stirring motion 2 x 10 each, 17#  12 in step ups with knee drive x30 ea   Goblet  Squats with 15# KB 2 x 10  Side steps with GTB at ankles c/ maintaining squat 40' x 2 laps  Dying bugs 2x10 ea  seated on large blue PB:    Bouncing x 3 min   pelvic tilts x 30   marches x 30 alt  Matrix hip abd 45# 2 x 15  Matrix hip add 55# 2 x 15    Home Exercises Provided and Patient Education Provided     Education provided:   - continue with relaxation and stretching techniques throughout day     Written Home Exercises Provided: Patient instructed to cont prior HEP. - given meditation resources   Exercises were reviewed and Rob was able to demonstrate them prior to the end of the session.  Rob demonstrated good  understanding of the education provided.     See EMR under Patient Instructions for exercises provided 6/9/2020.    Assessment     Pt had good tolerance to treatment today with no adverse effects. Continuation of progression of exercise from previous treatment session. Slight discomfort reported today with R sided pigeon stretch. She would benefit from R hip mobility exercises at next visit. Occasional need for verbal cueing for proper technique with therapeutic exercises.     Rob is progressing well towards her goals.   Pt prognosis is Fair.     Pt will continue to benefit from skilled outpatient physical therapy to address the deficits listed in the problem list box on initial evaluation, provide pt/family education and to maximize pt's level of independence in the home and community environment.   Pt's spiritual, cultural and educational needs considered and pt agreeable to plan of care and goals.     Anticipated barriers to physical therapy: none     Goals:  Short Term Goals:4 weeks   Pt able to tolerate moderate pressure to Coccyx region with minimal pain. Goal not met, continue  Pt able to sit for 30 min without assistive device or sgnificant discomfort. Goal not met, continue   Pt reports pain at worst 5/10. Goal not met, continue      Long Term Goals: 4 weeks ongoing, in  progress   Pt able to tolerate deep pressure to Coccyx region with minimal pain  Pt able to sit for 60 min without assistive device or sgnificant discomfort.  Pt reports pain at worst 2/10.    Plan     Continue with pelvic floor and hip strengthening and relaxation.    Natalie Barrientos, PT   07/15/2020

## 2020-07-20 ENCOUNTER — CLINICAL SUPPORT (OUTPATIENT)
Dept: REHABILITATION | Facility: HOSPITAL | Age: 40
End: 2020-07-20
Payer: MEDICAID

## 2020-07-20 DIAGNOSIS — R52 PAIN AGGRAVATED BY SITTING: Primary | ICD-10-CM

## 2020-07-20 DIAGNOSIS — R52 PAIN AND TENDERNESS: ICD-10-CM

## 2020-07-20 DIAGNOSIS — M53.3 COCCYX PAIN: ICD-10-CM

## 2020-07-20 PROCEDURE — 97110 THERAPEUTIC EXERCISES: CPT | Mod: PN

## 2020-07-20 NOTE — PROGRESS NOTES
Physical Therapy Daily Treatment Note     Name: Rob Lewis [place]  Clinic Number: [id number]    Therapy Diagnosis:   Encounter Diagnoses   Name Primary?    Coccyx pain     Pain and tenderness     Pain aggravated by sitting Yes     Physician: Federico Pina*    Visit Date: 2020    Physician Orders: PT Eval and Treat   Medical Diagnosis from Referral: Dorsalgia  Evaluation Date: 2020   Progress note due: 2020  Authorization Period Expiration: 2020  Plan of Care Expiration: 2020  Visit # / Visits authorized: 10/20    Time In: 7:30 am  Time Out: 8:15 am  Total Billable Time: 45 minutes    Precautions: Standard and L hip injury     Subjective     Pt reports: She has noticed she can sit for increased amount of time without significant coccyx pain. Unsure of exact times, states it varies from 15-30 minutes but was able to sit from multiple hours at the  without noticing pain.     She was compliant with home exercise program.  Response to previous treatment: good with no soreness  Functional change: less intense pain with sitting    Pain: 0/10  Location: center mid back; Low back coccyx region    Objective     Rob received therapeutic exercises to develop strength, endurance, ROM, flexibility, posture and core stabilization for 45 minutes including:    Diaphragmatic breathing x6 min    Shuttle squats 3 bands x 3 min warm up  Shuttle hip extension, Red cord 20x ea  Piriformis/glute stretch 3x30 sec ea  SL bridge x 20 ea   Belt-ball 5 sec hold x 20  SL clams RTB+ reverse clam x 20 ea  Deep squat stretch 2x1 min   Cat cow x15   Happy Baby 3x30 sec   [place] stretch on mat 3x30 sec ea   Open books x15 ea   Child's pose all directions 3x30 sec in middle, 1 x 30 sec to ea side  Short to tall kneeling x 20  SL clams, RTB 2x10  Alt UE/LE ext 15x   Seated flex with ball 5x 10 sec hold  +steamboats with hip abd/ext with RTB  15 each  Standing march x 20 ea  PALOF press at Free  motion 2 x 10, 17#  Paloff with stirring motion 2 x 10 each, 17#  12 in step ups with knee drive x30 ea   Goblet Squats with 15# KB 2 x 10  Side steps with GTB at ankles c/ maintaining squat 40' x 2 laps  Dying bugs 2x10 ea  seated on large blue PB:    Bouncing x 3 min   pelvic tilts x 30   marches x 30 alt  Matrix hip abd 45# 2 x 15  Matrix hip add 55# 2 x 15  +lateral walks and monster walks x 2 laps each, GTB    Home Exercises Provided and Patient Education Provided     Education provided:   - continue with relaxation and stretching techniques throughout day     Written Home Exercises Provided: Patient instructed to cont prior HEP. - given meditation resources   Exercises were reviewed and Rob was able to demonstrate them prior to the end of the session.  Rob demonstrated good  understanding of the education provided.     See EMR under Patient Instructions for exercises provided 6/9/2020.    Assessment     Pt had good tolerance to treatment today with no adverse effects. Has reported some decrease in overall coccyx pain and ability to sit for longer periods of time. Demonstrates some difficulty with maintaining neutral spine in dynamic positions. Progress of hip and core strengthening today with good tolerance. Occasional need for verbal cueing for proper technique with therapeutic exercises.     Rob is progressing well towards her goals.   Pt prognosis is Fair.     Pt will continue to benefit from skilled outpatient physical therapy to address the deficits listed in the problem list box on initial evaluation, provide pt/family education and to maximize pt's level of independence in the home and community environment.   Pt's spiritual, cultural and educational needs considered and pt agreeable to plan of care and goals.     Anticipated barriers to physical therapy: none     Goals:  Short Term Goals:4 weeks   Pt able to tolerate moderate pressure to Coccyx region with minimal pain. Goal not met,  continue  Pt able to sit for 30 min without assistive device or sgnificant discomfort. Goal met   Pt reports pain at worst 5/10. Goal met     Long Term Goals: 8 weeks ongoing, not met  Pt able to tolerate deep pressure to Coccyx region with minimal pain in progress   Pt able to sit for 60 min without assistive device or sgnificant discomfort. in progress   Pt reports pain at worst 2/10. in progress     Plan     Continue with pelvic floor and hip strengthening and relaxation.    Natalie Barrientos, PT   07/20/2020

## 2020-07-22 ENCOUNTER — CLINICAL SUPPORT (OUTPATIENT)
Dept: REHABILITATION | Facility: HOSPITAL | Age: 40
End: 2020-07-22
Payer: MEDICAID

## 2020-07-22 DIAGNOSIS — R52 PAIN AGGRAVATED BY SITTING: Primary | ICD-10-CM

## 2020-07-22 DIAGNOSIS — R52 PAIN AND TENDERNESS: ICD-10-CM

## 2020-07-22 DIAGNOSIS — M53.3 COCCYX PAIN: ICD-10-CM

## 2020-07-22 PROCEDURE — 97110 THERAPEUTIC EXERCISES: CPT | Mod: PN

## 2020-07-22 NOTE — PROGRESS NOTES
Physical Therapy Daily Treatment Note     Name: Rob Lewis [place]  Clinic Number: [id number]    Therapy Diagnosis:   Encounter Diagnoses   Name Primary?    Coccyx pain     Pain and tenderness     Pain aggravated by sitting Yes     Physician: Federico Pina*    Visit Date: 7/22/2020    Physician Orders: PT Eval and Treat   Medical Diagnosis from Referral: Dorsalgia  Evaluation Date: 5/19/2020   Progress note due: 8/19/2020  Authorization Period Expiration: 6/20/2020  Plan of Care Expiration: 5/31/2020  Visit # / Visits authorized: 11/20    Time In: 8:15 am  Time Out: 8:55 am  Total Billable Time: 30 minutes    Precautions: Standard and L hip injury     Subjective     Pt reports: Running late today coming from another appointment. Has been exercising multiple times a day secondary to having a lot on her mind right now. Discussed using another outlet for stress to not over do it.     She was compliant with home exercise program.  Response to previous treatment: good with no soreness  Functional change: less intense pain with sitting    Pain: 0/10  Location: center mid back; Low back coccyx region    Objective     Rob received therapeutic exercises to develop strength, endurance, ROM, flexibility, posture and core stabilization for 40 minutes including:    Diaphragmatic breathing x6 min    Shuttle squats 3 bands x 3 min warm up  Shuttle hip extension, Red cord 20x ea  Piriformis/glute stretch 3x30 sec ea  SL bridge x 20 ea   Belt-ball 5 sec hold x 20  SL clams RTB+ reverse clam x 20 ea  Deep squat stretch 2x1 min   Cat cow x15   Happy Baby 3x30 sec   Industry stretch on mat 3x30 sec ea   Open books x15 ea   Child's pose all directions 3x30 sec in middle, 1 x 30 sec to ea side  Short to tall kneeling x 20 with 15# KB  SL clams, RTB 2x10  Alt UE/LE ext 15x   Seated flex with ball 5x 10 sec hold  steamboats with hip abd/ext with RTB  20 each  Standing march x 20 ea  PALOF press at Free motion 2 x  10, 20#  Paloff with stirring motion 2 x 10 each, 20#  12 in step ups with knee drive x30 ea   Goblet Squats with 15# KB 2 x 15  Dying bugs 2x10 ea  seated on large blue PB:    Bouncing x 3 min   pelvic tilts x 30   Bridge walk outs x 10   marches x 30 alt  Matrix hip abd 45# 2 x 15  Matrix hip add 55# 2 x 15  lateral walks and monster walks x 2 laps each, GTB at ankle  +Chop on Free motion low to high, 10# x 15 each    Home Exercises Provided and Patient Education Provided     Education provided:   - continue with relaxation and stretching techniques throughout day     Written Home Exercises Provided: Patient instructed to cont prior HEP. - given meditation resources   Exercises were reviewed and Rob was able to demonstrate them prior to the end of the session.  Rob demonstrated good  understanding of the education provided.     See EMR under Patient Instructions for exercises provided 6/9/2020.    Assessment     Pt had good tolerance to treatment today with no adverse effects. No reported low back pain throughout treatment. States she had some pain with sitting and needs to reposition throughout if sitting for long periods of time. Pt possibly has some weakness of pelvic floor musculature causing continuation of pain. At this time she does not want an internal exam. Occasional need for verbal cueing for proper technique with therapeutic exercises.     Rob is progressing well towards her goals.   Pt prognosis is Fair.     Pt will continue to benefit from skilled outpatient physical therapy to address the deficits listed in the problem list box on initial evaluation, provide pt/family education and to maximize pt's level of independence in the home and community environment.   Pt's spiritual, cultural and educational needs considered and pt agreeable to plan of care and goals.     Anticipated barriers to physical therapy: none     Goals:  Short Term Goals:4 weeks   Pt able to tolerate moderate  pressure to Coccyx region with minimal pain. Goal not met, continue  Pt able to sit for 30 min without assistive device or sgnificant discomfort. Goal met   Pt reports pain at worst 5/10. Goal met     Long Term Goals: 8 weeks ongoing, not met  Pt able to tolerate deep pressure to Coccyx region with minimal pain in progress   Pt able to sit for 60 min without assistive device or sgnificant discomfort. in progress   Pt reports pain at worst 2/10. in progress     Plan     Continue with pelvic floor and hip strengthening and relaxation.    Natalie Barrientos, PT   07/22/2020

## 2020-07-27 ENCOUNTER — CLINICAL SUPPORT (OUTPATIENT)
Dept: REHABILITATION | Facility: HOSPITAL | Age: 40
End: 2020-07-27
Payer: MEDICAID

## 2020-07-27 DIAGNOSIS — R52 PAIN AGGRAVATED BY SITTING: Primary | ICD-10-CM

## 2020-07-27 DIAGNOSIS — M53.3 COCCYX PAIN: ICD-10-CM

## 2020-07-27 DIAGNOSIS — R52 PAIN AND TENDERNESS: ICD-10-CM

## 2020-07-27 PROCEDURE — 97110 THERAPEUTIC EXERCISES: CPT | Mod: PN

## 2020-07-27 NOTE — PROGRESS NOTES
Physical Therapy Daily Treatment Note     Name: Rob Lewis [place]  Clinic Number: [id number]    Therapy Diagnosis:   Encounter Diagnoses   Name Primary?    Coccyx pain     Pain and tenderness     Pain aggravated by sitting Yes     Physician: Federico Pina*    Visit Date: 7/27/2020    Physician Orders: PT Eval and Treat   Medical Diagnosis from Referral: Dorsalgia  Evaluation Date: 5/19/2020   Progress note due: 8/19/2020  Authorization Period Expiration: 6/20/2020  Plan of Care Expiration: 5/31/2020  Visit # / Visits authorized: 12/20    Time In: 7:56 am (arrived 26 minutes late)  Time Out: 8:49 am  Total Billable Time: 53 minutes    Precautions: Standard and L hip injury     Subjective     Pt reports: She feels the pain has lessened but continues to have discomfort in coccyx when sitting. Is going to physician for wrist pain and will speak about continued coccyx pain.    She was compliant with home exercise program.  Response to previous treatment: good with no soreness  Functional change: less intense pain with sitting    Pain: 0/10  Location: center mid back; Low back coccyx region    Objective     Rob received therapeutic exercises to develop strength, endurance, ROM, flexibility, posture and core stabilization for 53 minutes including:    Diaphragmatic breathing x6 min    Shuttle squats 3 bands x 3 min warm up  Shuttle hip extension, Red cord 20x ea  Piriformis/glute stretch 3x30 sec ea  SL bridge x 20 ea   Belt-ball 5 sec hold x 20  SL clams RTB+ reverse clam x 20 ea  Deep squat stretch 2x1 min   Cat cow x15   Happy Baby 3x30 sec   [place] stretch on mat 3x30 sec ea   Open books x15 ea   Child's pose all directions 3x30 sec in middle, 1 x 30 sec to ea side  Short to tall kneeling x 20 with 15# KB  +Quadruped alt UE/LE extension x 15 each  SL clams, RTB 2x10  Alt UE/LE ext 15x   Seated flex with ball 5x 10 sec hold   steamboats with hip abd/ext with RTB  20 each  Standing march x 20  ea  PALOF press at Free motion 2 x 10, 20#  Paloff with stirring motion 2 x 10 each, 20#  12 in step ups with knee drive x30 ea   Goblet Squats with 15# KB 2 x 15  Dying bugs 2x20 ea  seated on large blue PB:    Bouncing x 3 min   pelvic tilts x 30   Bridge walk outs x 10   marches x 30 alt  Matrix hip abd 45# 2 x 15  Matrix hip add 55# 2 x 15  lateral walks and monster walks x 2 laps each, GTB at ankle  Half knee Chop on Free motion low to high, 13# x 15 each  +Lunges with 15# KB x 15 each  +straight leg dead leg 10# DB x 12    Home Exercises Provided and Patient Education Provided     Education provided:   - continue with relaxation and stretching techniques throughout day     Written Home Exercises Provided: Patient instructed to cont prior HEP. - given meditation resources   Exercises were reviewed and Rob was able to demonstrate them prior to the end of the session.  Rob demonstrated good  understanding of the education provided.     See EMR under Patient Instructions for exercises provided 6/9/2020.    Assessment     Pt had good tolerance to treatment today with no adverse effects. Progression of hip and lumbar strengthening and stability. Addition of single leg stability exercises without increase in pain. Pt only reports pain with sitting for over 10 minutes and moderate to deep palpation of coccyx. Occasional need for verbal cueing for proper technique with therapeutic exercises.     Rob is progressing well towards her goals.   Pt prognosis is Fair.     Pt will continue to benefit from skilled outpatient physical therapy to address the deficits listed in the problem list box on initial evaluation, provide pt/family education and to maximize pt's level of independence in the home and community environment.   Pt's spiritual, cultural and educational needs considered and pt agreeable to plan of care and goals.     Anticipated barriers to physical therapy: none     Goals:  Short Term Goals:4  weeks   Pt able to tolerate moderate pressure to Coccyx region with minimal pain. Goal not met, continue  Pt able to sit for 30 min without assistive device or sgnificant discomfort. Goal met   Pt reports pain at worst 5/10. Goal met     Long Term Goals: 8 weeks ongoing, not met  Pt able to tolerate deep pressure to Coccyx region with minimal pain in progress   Pt able to sit for 60 min without assistive device or sgnificant discomfort. in progress   Pt reports pain at worst 2/10. in progress     Plan     Continue with pelvic floor and hip strengthening and relaxation.    Natalie Barrientos, PT   07/27/2020

## 2020-07-29 ENCOUNTER — CLINICAL SUPPORT (OUTPATIENT)
Dept: REHABILITATION | Facility: HOSPITAL | Age: 40
End: 2020-07-29
Payer: MEDICAID

## 2020-07-29 DIAGNOSIS — R52 PAIN AGGRAVATED BY SITTING: Primary | ICD-10-CM

## 2020-07-29 DIAGNOSIS — R52 PAIN AND TENDERNESS: ICD-10-CM

## 2020-07-29 DIAGNOSIS — M53.3 COCCYX PAIN: ICD-10-CM

## 2020-07-29 PROCEDURE — 97110 THERAPEUTIC EXERCISES: CPT | Mod: PN

## 2020-07-29 NOTE — PROGRESS NOTES
Physical Therapy Daily Treatment Note     Name: Rob Lewis [facility] Number: [id number]    Therapy Diagnosis:   Encounter Diagnoses   Name Primary?    Coccyx pain     Pain and tenderness     Pain aggravated by sitting Yes       Physician: Federico Pina*    Visit Date: 7/29/2020    Physician Orders: PT Eval and Treat   Medical Diagnosis from Referral: Dorsalgia  Evaluation Date: 5/19/2020  Progress note due: 8/19/2020  Authorization Period Expiration: 6/20/2020  Plan of Care Expiration: 5/31/2020  Visit # / Visits authorized: 13/20    Time In: 8:02 am  Time Out: 8:56 am  Total Billable Time: 54 minutes    Precautions: Standard and L hip injury     Subjective     Pt reports: She feels the pain has lessened but continues to have discomfort in coccyx when sitting. Is going to physician for wrist pain and will speak about continued coccyx pain.    She was compliant with home exercise program.  Response to previous treatment: good with no soreness  Functional change: less intense pain with sitting    Pain: 0/10  Location: center mid back; Low back coccyx region    Objective     Progress note:   Palpation: ttp over coccyx externally   Ability to sit for 30 minutes: no about 10-15 minutes   Pain at worse: 6/10 when sitting for long periods of time but if a deep pressure over coccyx it can get to 10/10  Hip ROM and flexibility: tight internal rotators     CMS Impairment/Limitation/Restriction for FOTO Lumbar Spine Survey  Status Limitation G-Code CMS Severity Modifier  Intake 72% 28%  Predicted 71% 29% Goal Status+ CJ - At least 20 percent but less than 40 percent  6/16/2020 94% 6%  7/29/2020 94% 6% Current Status CI - At least 1 percent but less than 20 percent  D/C Status CI **only report if this is discharge survey  +Based on FOTO predicted change score    Rob received therapeutic exercises to develop strength, endurance, ROM, flexibility, posture and core stabilization for 54 minutes  including:    Diaphragmatic breathing x6 min    Shuttle squats 3 bands x 3 min warm up  SL squat 3 bands x 20 each  Shuttle hip extension, Red cord 20x ea  Piriformis/glute stretch 3x30 sec ea  SL bridge x 20 ea   Belt-ball 5 sec hold x 20  SL clams RTB+ reverse clam x 20 ea  Deep squat stretch 2x1 min   Cat cow x15   Happy Baby 3x30 sec   [place] stretch on mat 3x30 sec ea   Open books x15 ea   Child's pose all directions 3x30 sec in middle, 1 x 30 sec to ea side  Short to tall kneeling x 20 with 15# KB  Quadruped alt UE/LE extension x 15 each  SL clams, RTB 2x10  Alt UE/LE ext 15x   Seated flex with ball 5x 10 sec hold   steamboats with hip abd/ext with RTB  20 each  Standing march x 20 ea  PALOF press at Free motion 2 x 10, 20#  Paloff with stirring motion 2 x 10 each, 20#  12 in step ups with knee drive x30 ea   Goblet Squats with 20# KB 2 x 15, depth to 12 inch platform  Dying bugs 2x20 ea  seated on large blue PB:    Bouncing x 3 min   pelvic tilts x 30   Bridge walk outs x 10   marches x 30 alt  Matrix hip abd 45# 2 x 15  Matrix hip add 55# 2 x 15  lateral walks and monster walks x 2 laps each, GTB at ankle  Half knee Chop on Free motion low to high, 13# 2x 15 each  Lunges with 15# KB x 15 each  straight leg dead lift 10# DB x 12    Home Exercises Provided and Patient Education Provided     Education provided:   - continue with relaxation and stretching techniques throughout day     Written Home Exercises Provided: Patient instructed to cont prior HEP. - given meditation resources   Exercises were reviewed and Rob was able to demonstrate them prior to the end of the session.  Rob demonstrated good  understanding of the education provided.     See EMR under Patient Instructions for exercises provided 6/9/2020.    Assessment     Pt reassessed today with report of minimal increase in tolerance to sitting for 10-15 minutes. No report of any low back pain or discomfort with other activities. No  complaints during any exercises today. Discussed proper posture and sitting with use of lumbar roll for support and decreasing pressure on coccyx. Educated pt on referral to pelvic  for internal examination, pt unsure if that is something she would want at this time. Referred back to physician at this time as pt only reports tenderness with direct pressure on coccyx. Pt only reports pain with sitting for over 10 minutes and moderate to deep palpation of coccyx. Pt discharged from therapy at this time.    Rob is progressing well towards her goals.   Pt prognosis is Fair.      Anticipated barriers to physical therapy: none     Goals:  Short Term Goals:4 weeks   Pt able to tolerate moderate pressure to Coccyx region with minimal pain. Goal not met, continue  Pt able to sit for 30 min without assistive device or sgnificant discomfort. Not met consistently  Pt reports pain at worst 5/10. Ongoing, not met     Long Term Goals: 8 weeks ongoing, not met  Pt able to tolerate deep pressure to Coccyx region with minimal pain in progress   Pt able to sit for 60 min without assistive device or sgnificant discomfort. in progress   Pt reports pain at worst 2/10. in progress     Plan     Discharged from PT.     Natalie Barrientos, PT   07/29/2020

## 2020-08-10 ENCOUNTER — HOSPITAL ENCOUNTER (OUTPATIENT)
Dept: RADIOLOGY | Facility: HOSPITAL | Age: 40
Discharge: HOME | End: 2020-08-10
Attending: INTERNAL MEDICINE
Payer: MEDICAID

## 2020-08-10 DIAGNOSIS — M79.641 PAIN OF RIGHT HAND: ICD-10-CM

## 2020-08-10 DIAGNOSIS — M79.641 RIGHT HAND PAIN: Primary | ICD-10-CM

## 2020-08-10 DIAGNOSIS — M79.641 PAIN OF RIGHT HAND: Primary | ICD-10-CM

## 2020-08-10 PROCEDURE — 73130 X-RAY EXAM OF HAND: CPT | Mod: 26,RT,, | Performed by: RADIOLOGY

## 2020-08-10 PROCEDURE — 73130 XR HAND COMPLETE 3 VIEW RIGHT: ICD-10-PCS | Mod: 26,RT,, | Performed by: RADIOLOGY

## 2020-08-10 PROCEDURE — 73130 X-RAY EXAM OF HAND: CPT | Mod: TC,FY,RT

## 2020-08-11 ENCOUNTER — TELEPHONE (OUTPATIENT)
Dept: NEUROLOGY | Facility: CLINIC | Age: 40
End: 2020-08-11

## 2020-08-11 NOTE — TELEPHONE ENCOUNTER
Made an EMG appt.              ----- Message from Rylee Mookie sent at 8/11/2020 10:42 AM CDT -----  ..Type: Patient Call Back    Who called:  Brad with Dr. Pina's office    What is the request in detail: Calling to schedule pt's nerve conduction study.     Can the clinic reply by [facility]? No     Would the patient rather a call back or a response via [facility]? Call back     Best call back number: [phone]    Additional Information: Rep states they faxed orders over.

## 2020-08-18 ENCOUNTER — PROCEDURE VISIT (OUTPATIENT)
Dept: NEUROLOGY | Facility: CLINIC | Age: 40
End: 2020-08-18
Payer: MEDICAID

## 2020-08-18 VITALS — BODY MASS INDEX: 31.7 KG/M2 | WEIGHT: 201.94 LBS | HEIGHT: 67 IN

## 2020-08-18 DIAGNOSIS — M79.641 PAIN OF RIGHT HAND: ICD-10-CM

## 2020-08-18 PROCEDURE — 95911 NRV CNDJ TEST 9-10 STUDIES: CPT | Mod: PBBFAC | Performed by: NEUROLOGICAL SURGERY

## 2020-08-18 PROCEDURE — 95886 PR EMG COMPLETE, W/ NERVE CONDUCTION STUDIES, 5+ MUSCLES: ICD-10-PCS | Mod: 26,S$PBB,, | Performed by: NEUROLOGICAL SURGERY

## 2020-08-18 PROCEDURE — 95911 NRV CNDJ TEST 9-10 STUDIES: CPT | Mod: 26,S$PBB,, | Performed by: NEUROLOGICAL SURGERY

## 2020-08-18 PROCEDURE — 95911 PR NERVE CONDUCTION STUDY; 9-10 STUDIES: ICD-10-PCS | Mod: 26,S$PBB,, | Performed by: NEUROLOGICAL SURGERY

## 2020-08-18 PROCEDURE — 95886 MUSC TEST DONE W/N TEST COMP: CPT | Mod: PBBFAC | Performed by: NEUROLOGICAL SURGERY

## 2020-08-18 PROCEDURE — 95886 MUSC TEST DONE W/N TEST COMP: CPT | Mod: 26,S$PBB,, | Performed by: NEUROLOGICAL SURGERY

## 2020-08-19 ENCOUNTER — TELEPHONE (OUTPATIENT)
Dept: NEUROLOGY | Facility: CLINIC | Age: 40
End: 2020-08-19

## 2020-08-19 NOTE — TELEPHONE ENCOUNTER
Notified Dr. Pina's office that the report will not be ready until next week.          ----- Message from Anneliese Gomez sent at 8/19/2020  8:42 AM CDT -----  Contact: Dr. Pina's Office [phone]  Type: Patient Call Back    Who called: Dr. Pina's Office    What is the request in detail: calling in regards to getting the notes from yesterday's EMG faxed over to them. Fax# [phone]    Can the clinic reply by [facility]? Call back    Would the patient rather a call back or a response via [facility]? Call back    Best call back number: [phone]

## 2020-09-17 DIAGNOSIS — G56.01 CARPAL TUNNEL SYNDROME ON RIGHT: Primary | ICD-10-CM

## 2020-09-28 ENCOUNTER — CLINICAL SUPPORT (OUTPATIENT)
Dept: REHABILITATION | Facility: HOSPITAL | Age: 40
End: 2020-09-28
Payer: MEDICAID

## 2020-09-28 DIAGNOSIS — R20.0 NUMBNESS OF RIGHT HAND: ICD-10-CM

## 2020-09-28 PROBLEM — R29.3 BAD POSTURE: Status: RESOLVED | Noted: 2019-02-18 | Resolved: 2020-09-28

## 2020-09-28 PROBLEM — M25.561 RIGHT KNEE PAIN: Status: RESOLVED | Noted: 2018-08-03 | Resolved: 2020-09-28

## 2020-09-28 PROBLEM — M62.81 MUSCLE WEAKNESS: Status: RESOLVED | Noted: 2018-08-03 | Resolved: 2020-09-28

## 2020-09-28 PROBLEM — M25.60 JOINT STIFFNESS: Status: RESOLVED | Noted: 2018-08-03 | Resolved: 2020-09-28

## 2020-09-28 PROBLEM — M53.3 COCCYX PAIN: Status: RESOLVED | Noted: 2020-05-19 | Resolved: 2020-09-28

## 2020-09-28 PROBLEM — M54.2 NECK PAIN ON RIGHT SIDE: Status: RESOLVED | Noted: 2019-02-18 | Resolved: 2020-09-28

## 2020-09-28 PROCEDURE — 97165 OT EVAL LOW COMPLEX 30 MIN: CPT | Mod: PN

## 2020-09-28 NOTE — PLAN OF CARE
[facility] Occupational Therapy  Initial Evaluation     Date:  9/28/2020    Name: Rob Hernández  Clinic Number: [id number]    Therapy Diagnosis:   1. Numbness of right hand        Physician: Federico Pina*    Physician Orders: Eval and Treat  Medical Diagnosis: Carpal Tunnel Syndrome  Surgical Procedure and Date: n/a,   Evaluation Date: 09/28/2020  Insurance Authorization Period Expiration: tbd  Plan of Care Certification Period: 9/28/20 - 11/9/20  Date of Return to MD: not appointed    Visit # / Visits authorized: 1 / 1    Time In: 1:40 pm  Time Out: 2:30 pm  Total Billable Time: 50 minutes    Precautions:  Standard    Subjective     Involved Side: right   Dominant Side: Right  Date of Onset: years ago   Mechanism of Injury: no specific injury but has had symptoms increased over time  History of Current Condition: symptoms have increased to having numbness from ear lobe to my finger tips  Surgical Procedure: none  Imaging: xray EXAMINATION:  XR HAND COMPLETE 3 VIEW RIGHT     CLINICAL HISTORY:  Pain in right hand     TECHNIQUE:  XR HAND COMPLETE 3 VIEW RIGHT     COMPARISON:  None     FINDINGS:  No bone, joint, or soft tissue abnormality is seen.     Impression:     Normal  Electronically signed by: Hugo Cleary Jr  Date:                                            08/10/2020    Previous Therapy: for her low back / coccyx injury    Past Medical History/Physical Systems Review:   Rob Hernández  has a past medical history of Torn ligament and Torn meniscus.    Rob Hernández  has a past surgical history that includes left knee surg; right shoulder surg; Inner ear surgery; Vaginal delivery; Tubal ligation; and Inner ear surgery.    Rob has a current medication list which includes the following prescription(s): biotin, dexamethasone, hydrocodone-acetaminophen, ibuprofen, ibuprofen, omeprazole, and ondansetron.    Review of patient's allergies  indicates:  No Known Allergies     Patient's Goals for Therapy:  Not sure just avoiding surgery.    Pain:  Functional Pain Scale Rating 0-10:   3-4/10 on average  1-2/10 at best  5-6/10 at worst  Location: ulnar sie of hand and ring/small fingers  Description: Burning and Numb  Aggravating Factors: long periods of hand use  Easing Factors: taping small and ring fingers together, wearing a cold glove, paraffin    Occupation:     Working presently: self-employed  Duties: work days are 10-12 hour days    Functional Limitations/Social History:    Previous functional status includes: Independent with all ADLs.     Current FunctionalStatus   Home/Living environment : lives with their family      Limitation of Functional Status as follows:   ADLs/IADLs:     - Feeding: I    - Bathing: I    - Dressing/Grooming: I    - Driving: I                 Pushes through her pain to perform home activities and work   Leisure: she enjoys her work    Objective    Observation: Skin intact, no deformities noted   Sensation: Ulnar/median Nerve in hand  Intact  [place] Rasheeda Monofilament Test: Yes  Results: 2.83  Stereognosis: Intact    Special Tests:   Tinels  negative and Phalens  negative              Compression at cubital tunnel and guyon's canal    Wound Assessment: n/a    Edema: Circumferential measurements: none noted      Range of Motion: right Active WFL    Thumb Opposition: to all tips   Palmar Abduction: WFL  Radial Abduction: WFL                            Left       Right   Wrist Ext/Flex: 55/65    50/60  Wrist RD/UD: 15/35    15/35  Supination/Pronation: WFL/WFL  Elbow extension/flexion: WFL/WFL    Manual Muscle Test:   Wrist ext/flexion/ UD/RD bilateral 5/5  Finger abd/add 4+/5     Strength: (JORDY Dynamometer in lbs.) Average 3 trials, Position II  Right: 94.5  Left: 86.5#    Pinch Strength: (Pinch Gauge in psi's), Average 3 trials  3pt Pinch   R) 17.6psi's  L) 15.6 psi's- thumb with small and ring  fingers      Fine Nishi Coordination Tests:   9 hole Peg Test R) 20 seconds no drops  L) 20 seconds no drops      Treatment       Home Exercise Program/Education:  Issued HEP (see patient instructions in EMR) and educated on modality use for pain management . Exercises were reviewed and Rob was able to demonstrate them prior to the end of the session.   Pt received a written copy of exercises to perform at home. Rob demonstrated good  understanding of the education provided.  Pt was advised to perform these exercises free of pain, and to stop performing them if pain occurs.    Patient/Family Education: role of OT, goals for OT, scheduling/cancellations - pt verbalized understanding. Discussed insurance limitations with patient.    Additional Education provided: re: the progression of nerve compression as it relates function    Assessment     Rob Hernández is a 40 y.o. female referred to outpatient occupational therapy and presents with a medical diagnosis of CTS, resulting in nerve pain and demonstrates no limitations at this time. Following medical record review it is determined that pt will benefit from occupational therapy services in order to reduce nerve symptoms of right hand/forearm. The following goals were discussed with the patient and patient is in agreement with them as to be addressed in the treatment plan. The patient's rehab potential is Good.     Anticipated barriers to occupational therapy: none noted  Pt has no cultural, educational or language barriers to learning provided.    Profile and History Assessment of Occupational Performance Level of Clinical Decision Making Complexity Score   Occupational Profile:   Rob Hernández is a 40 y.o. female who lives with their family and is currently self-employed as hair dressing. Rob Hernández has difficulty with  no tasks only sensation changes in her hand ulnar nerve distibution  affecting his/her daily  functional abilities. His/her main goal for therapy is  Not sure just avoiding surgery..     Comorbidities:   recurrent urinary infections and none noted    Medical and Therapy History Review:   Brief               Performance Deficits    Physical:  No Deficits  Pain    Cognitive:  No Deficits    Psychosocial:    No Deficits     Clinical Decision Making:  low    Assessment Process:  Detailed Assessments    Modification/Need for Assistance:  Not Necessary    Intervention Selection:  Limited Treatment Options       low  Based on PMHX, co morbidities , data from assessments and functional level of assistance required with task and clinical presentation directly impacting function.       The following goals were discussed with the patient and patient is in agreement with them as to be addressed in the treatment plan.     Goals:   Return for f/u with home program and/or changes in her hand symptoms      Plan   Certification Period/Plan of care expiration: 9/28/2020 to 11/9/20.    Outpatient Occupational Therapy prn for 6 weeks to include the following interventions: progression of HEP.      [facility]    I certify the need for these services furnished under this plan of treatment and while under my care    ____________________________________  Physician/Referring Practitioner    _______________  Date of Signature

## 2020-09-28 NOTE — PATIENT INSTRUCTIONS
MEDIAN NERVE: Mobilization II        Stand with right arm in front of body, palm up. Use opposite hand to pull hand back, fingers relaxed.  Do _1__ sets of _19__ repetitions per session. Do _5__ sessions per day.    Copyright © I. All rights reserved.   ULNAR NERVE: Mobilization II        Stand with right arm at shoulder height, hand bent back, fingers straight. Bend elbow.  Do _1__ sets of _10__ repetitions per session. Do _5__ sessions per day.    Copyright © I. All rights reserved.

## 2020-11-09 ENCOUNTER — HOSPITAL ENCOUNTER (EMERGENCY)
Facility: HOSPITAL | Age: 40
Discharge: HOME | End: 2020-11-09
Attending: EMERGENCY MEDICINE
Payer: MEDICAID

## 2020-11-09 VITALS
SYSTOLIC BLOOD PRESSURE: 132 MMHG | DIASTOLIC BLOOD PRESSURE: 74 MMHG | OXYGEN SATURATION: 98 % | TEMPERATURE: 99 F | HEART RATE: 74 BPM | BODY MASS INDEX: 31.08 KG/M2 | HEIGHT: 67 IN | RESPIRATION RATE: 18 BRPM | WEIGHT: 198 LBS

## 2020-11-09 DIAGNOSIS — S60.221A CONTUSION OF RIGHT HAND, INITIAL ENCOUNTER: Primary | ICD-10-CM

## 2020-11-09 LAB
B-HCG UR QL: NEGATIVE
CTP QC/QA: YES

## 2020-11-09 PROCEDURE — 81025 URINE PREGNANCY TEST: CPT | Performed by: NURSE PRACTITIONER

## 2020-11-09 PROCEDURE — 99283 EMERGENCY DEPT VISIT LOW MDM: CPT | Mod: 25

## 2020-11-09 NOTE — ED NOTES
Pt presents to ED with c/o right hand pain x 2 weeks. Reports hitting wall and having pain since then. Seen by PCP today and recommended ED evaluation for boxer fracture. Pt reports wrapping hand and getting relief. Denies taking meds for pain. NAD noted. No noted deformities.

## 2020-11-09 NOTE — FIRST PROVIDER EVALUATION
" Emergency Department TeleTriage Encounter Note      CHIEF COMPLAINT    Chief Complaint   Patient presents with    Hand Pain     right hand pain ,sent from MD office for "boxer fx"       VITAL SIGNS   Initial Vitals [11/09/20 1209]   BP Pulse Resp Temp SpO2   132/74 74 18 98.9 °F (37.2 °C) 98 %      MAP       --            ALLERGIES    Review of patient's allergies indicates:  No Known Allergies    PROVIDER TRIAGE NOTE  This is a teletriage evaluation of a 40 y.o. female presenting to the ED with c/o right hand pain with fracture as advised by primary care. No xrays done by primary care. Initial orders will be placed and care will be transferred to an alternate provider when patient is roomed for a full evaluation. Any additional orders and the final disposition will be determined by that provider.         ORDERS  Labs Reviewed   POCT URINE PREGNANCY       ED Orders (720h ago, onward)    Start Ordered     Status Ordering Provider    11/09/20 1214 11/09/20 1213  POCT urine pregnancy  Once      Ordered MADAY PEARSON    11/09/20 1214 11/09/20 1214  X-Ray Hand 3 view Right  1 time imaging      Ordered MADAY PEARSON            Virtual Visit Note: The provider triage portion of this emergency department evaluation and documentation was performed via [employer], a HIPAA-compliant telemedicine application, in concert with a tele-presenter in the room. A face to face patient evaluation with one of my colleagues will occur once the patient is placed in an emergency department room.      DISCLAIMER: This note was prepared with [employer] voice recognition transcription software. Garbled syntax, mangled pronouns, and other bizarre constructions may be attributed to that software system.  "

## 2020-11-09 NOTE — ED PROVIDER NOTES
"Encounter Date: 11/9/2020    SCRIBE #1 NOTE: I, Ronna Ricci, am scribing for, and in the presence of,  Richard Pérez PA-C. I have scribed the following portions of the note - Other sections scribed: HPI, ROS, PE.       History     Chief Complaint   Patient presents with    Hand Pain     right hand pain ,sent from MD office for "boxer fx"     This is a 40 y.o female presenting to the ED for an evaluation for acute onset, constant right hand pain x 1 week.  Patient reports she began having hand pain after she punched a glass picture.  She reports since the incident she has been wrapping her hand with Athletic tape, which she reports helped reduced some of her discomfort.  She states she was advised to come to the ED by her MD to rule out a possible boxer's fracture.  She denies wrist pain, elbow pain, extremity numbness, extremity weakness, or any other associated symptoms.      The history is provided by the patient.     Review of patient's allergies indicates:  No Known Allergies  Past Medical History:   Diagnosis Date    Torn ligament     left shoulder    Torn meniscus      Past Surgical History:   Procedure Laterality Date    INNER EAR SURGERY      INNER EAR SURGERY      left knee surg      right shoulder surg      TUBAL LIGATION      VAGINAL DELIVERY      x3     Family History   Problem Relation Age of Onset    Hypertension Mother      Social History     Tobacco Use    Smoking status: Never Smoker    Smokeless tobacco: Never Used   Substance Use Topics    Alcohol use: Yes     Comment: occ    Drug use: Yes     Types: Marijuana     Review of Systems   Constitutional: Negative for chills and fever.   HENT: Negative for congestion, ear pain, rhinorrhea and sore throat.    Eyes: Negative for pain and visual disturbance.   Respiratory: Negative for cough and shortness of breath.    Cardiovascular: Negative for chest pain.   Gastrointestinal: Negative for abdominal pain, diarrhea, nausea and vomiting. "   Genitourinary: Negative for dysuria.   Musculoskeletal: Positive for arthralgias. Negative for back pain and neck pain.   Skin: Negative for rash.   Neurological: Negative for weakness, numbness and headaches.       Physical Exam     Initial Vitals [11/09/20 1209]   BP Pulse Resp Temp SpO2   132/74 74 18 98.9 °F (37.2 °C) 98 %      MAP       --         Physical Exam    Constitutional: She appears well-developed and well-nourished. She is not diaphoretic. No distress.   HENT:   Head: Normocephalic and atraumatic.   Nose: Nose normal.   Eyes: EOM are normal. Pupils are equal, round, and reactive to light.   Neck: Normal range of motion. Neck supple.   Cardiovascular: Normal rate and intact distal pulses.   Pulmonary/Chest: No respiratory distress.   Musculoskeletal: Normal range of motion. Tenderness and edema present.      Comments: Mild tenderness and subtle edema over the 4th and 5th MCP joints without deformity.   Neurological: She is alert and oriented to person, place, and time. No sensory deficit.   Skin: Skin is warm and dry. Capillary refill takes less than 2 seconds. No rash noted. No erythema.         ED Course   Procedures  Labs Reviewed   POCT URINE PREGNANCY          Imaging Results          X-Ray Hand 3 view Right (Final result)  Result time 11/09/20 12:54:23    Final result by Rene Morales MD (11/09/20 12:54:23)                 Impression:      No acute displaced fracture-dislocation identified.      Electronically signed by: Rene Morales MD  Date:    11/09/2020  Time:    12:54             Narrative:    EXAMINATION:  XR HAND COMPLETE 3 VIEW RIGHT    CLINICAL HISTORY:  hand pain;    TECHNIQUE:  PA, lateral, and oblique views of the right hand were performed.    COMPARISON:  Right hand series 08/10/2020    FINDINGS:  Bones are well mineralized. Overall alignment is within normal limits. No displaced fracture, dislocation or destructive osseous process.  Grossly similar cystic change of the hamate,  likely degenerative.  Joint spaces appear relatively maintained. No subcutaneous emphysema or radiodense retained foreign body.                              X-Rays:   Independently Interpreted Readings:   Other Readings:  X-ray right hand with no evidence of fracture or dislocation    Medical Decision Making:   ED Management:  40-year-old female with right hand pain x1 week after punching an object.  Mild swelling and tenderness over the 4th and 5th MCP joints without deformity.  X-ray negative for fracture.  Velcro splint offered for comfort.  Orthopedic resources provided for further evaluation if pain persists.            Scribe Attestation:   Scribe #1: I performed the above scribed service and the documentation accurately describes the services I performed. I attest to the accuracy of the note.                      Clinical Impression:     ICD-10-CM ICD-9-CM   1. Contusion of right hand, initial encounter  S60.221A 923.20                          ED Disposition Condition    Discharge Stable        ED Prescriptions     None        Follow-up Information     Follow up With Specialties Details Why Contact Info    Federico Pina MD Internal Medicine, Pediatrics   [address]   [address]  [facility] [address]  [phone]      [facility] Emergency Medicine Go to  If symptoms worsen [address] [address] [phone]    Shlomo Shukla MD Orthopedic Surgery  For orthopedic evaluation if pain persists [address]  [facility] [address]  [phone]                                  I, Richard Pérez, personally performed the services described in this documentation. All medical record entries made by the scribe were at my direction and in my presence. I have reviewed the chart and agree that the record reflects my personal performance and is accurate and complete.       Richard Pérez, PARobsonC  11/09/20 2909

## 2020-11-23 ENCOUNTER — DOCUMENTATION ONLY (OUTPATIENT)
Dept: REHABILITATION | Facility: HOSPITAL | Age: 40
End: 2020-11-23

## 2020-11-23 DIAGNOSIS — R20.0 NUMBNESS OF RIGHT HAND: Primary | ICD-10-CM

## 2020-11-23 NOTE — PROGRESS NOTES
Outpatient Therapy Discharge Summary     Name: Rob Lewis [facility] Number: [id number]    Therapy Diagnosis:   Encounter Diagnosis   Name Primary?    Numbness of right hand Yes     Physician: Federico Pina*    Physician Orders: Eval and Treat  Medical Diagnosis: Carpal Tunnel Syndrome  Evaluation Date: 20      Date of Last visit: 20  Total Visits Received: 1  Cancelled Visits: 0  No Show Visits: 0    Assessment    Goals: Return for f/u with home program and/or changes in her hand symptoms    Discharge reason: Other:  Patient did not return for additional information, plan of care .     Plan   This patient is discharged from Occupational Therapy

## 2021-01-14 NOTE — PROGRESS NOTES
"  Physical Therapy Daily Treatment Note     Name: Rob [place]  Clinic Number: [id number]    Therapy Diagnosis:   Encounter Diagnoses   Name Primary?    Neck pain on right side     Bad posture      Physician: Federico Pina*    Visit Date: 4/15/2019    Physician Orders: PT Eval and Treat   Medical Diagnosis from Referral: M54.2 (ICD-10-CM) - Neck pain  Evaluation Date: 2/18/2019  Authorization Period Expiration: 7/16/2019  Plan of Care Expiration: 5/18/2019  Visit # / Visits authorized: 7/ 20    Time In: 4:20 pm  Time Out: 5:25 pm  Total Billable Time: 55 minutes    Precautions: Standard    Subjective     Pt reports: Feels good today, no pain to report.     She was compliant with home exercise program.  Response to previous treatment: increase muscle strength  Functional change:  None    Pain: 0/10  Location: right neck      Objective     BOLD = Performed Today  + = New Exercise or Progression    Rob received therapeutic exercises to develop strength, endurance, ROM, flexibility and posture for 55 minutes including:      Exercise Resistance Sets/Reps/Hold    UBE  8 mins     Chin Tucks w/ ANA towel  20x 5 secs    UT, Lev Scap Stretches  3 x 30"     S/L Thoracic ROT stretch  15x B    Scaption 3lbs 3x 10    Row on Matrix 40lbs 3x 10    Supine HABD w/ TB on 1/2 FR Red 3x 10    Shoulder extension on CC 7lbs 3 x 10     Seated no moneys Red 3 x 10     Wall slide w/lift off   YTB 2 x 10     Supine shoulder flexion 1/2 FR 3# dowel  3 x 10     Push up plus-edge of mat  2 x 10     Corner stretch   10 x 10"     Prone Y  2 x 10     Prone T  2 x 10     Prone W  2 x 10     Prone I  2 x 10     Bench Row 10lbs 2x10    Hooklying Unilateral Y's  5 secsx 20    Planks on forearms  3x25 secs                           Rob received the following manual therapy techniques: Joint mobilizations, Manual traction, Myofacial release and Soft tissue Mobilization were applied to the: cervical musculature for 0 " Agree with home care minutes, including IASTM.      Rob received hot pack for 10 minutes to cervical spine.    Home Exercises Provided and Patient Education Provided     Education provided:   - Exercise progressions, postural correction    Written Home Exercises Provided: Patient instructed to cont prior HEP.  Exercises were reviewed and Rob was able to demonstrate them prior to the end of the session.  Rob demonstrated good  understanding of the education provided.     See EMR under Patient Instructions for exercises provided prior visit.    Assessment       Rob is progressing well towards her goals. Patient tolerated today's session well. Pt demonstrated good tolerance to neck and periscapular exercises.  Pt is ready to be progressed in cervical exercises next session. Continues to benefit from skilled therapies for staff guidance and cues for progression of exercise routine.     Pt prognosis is Excellent.     Pt will continue to benefit from skilled outpatient physical therapy to address the deficits listed in the problem list box on initial evaluation, provide pt/family education and to maximize pt's level of independence in the home and community environment.     Pt's spiritual, cultural and educational needs considered and pt agreeable to plan of care and goals.     Anticipated barriers to physical therapy: None    Goals:  Short Term GOALS: 2 weeks. Pt agrees with goals set.  1. Patient demonstrates independence with HEP.   2. Patient demonstrates independence with Postural Awareness.   3. Patient demonstrates independence with body mechanics.   4. Patient will report pain of 2/10 at worst, on 0-10 pain scale, with all activity     Long Term GOALS: 4 weeks. Pt agrees with goals set.  1. Patient reports sleeping through the night without limitations d/t cervical pain for at least 7 consecutive days.    2. Patient demonstrates ability to lift at least 25lbs with good mechanics and no pain in cervical region.     3. Patient reports ability to participate in all prayer activities without limitations d/t cervical pain.  4. Patient will report pain of 1/10 at worst, on 0-10 pain scale, with all activity      Plan     Assess response, continue w/ POC.      Roel Aceves, PT   04/15/2019

## 2021-04-15 ENCOUNTER — PATIENT MESSAGE (OUTPATIENT)
Dept: RESEARCH | Facility: HOSPITAL | Age: 41
End: 2021-04-15

## 2021-05-19 ENCOUNTER — HOSPITAL ENCOUNTER (EMERGENCY)
Facility: HOSPITAL | Age: 41
Discharge: HOME | End: 2021-05-19
Payer: MEDICAID

## 2021-05-19 VITALS
OXYGEN SATURATION: 99 % | TEMPERATURE: 99 F | RESPIRATION RATE: 18 BRPM | DIASTOLIC BLOOD PRESSURE: 59 MMHG | WEIGHT: 226 LBS | SYSTOLIC BLOOD PRESSURE: 111 MMHG | HEART RATE: 71 BPM | HEIGHT: 67 IN | BODY MASS INDEX: 35.47 KG/M2

## 2021-05-19 DIAGNOSIS — R07.9 CHEST PAIN: ICD-10-CM

## 2021-05-19 LAB
ALBUMIN SERPL BCP-MCNC: 3.9 G/DL (ref 3.5–5.2)
ALP SERPL-CCNC: 60 U/L (ref 55–135)
ALT SERPL W/O P-5'-P-CCNC: 20 U/L (ref 10–44)
ANION GAP SERPL CALC-SCNC: 8 MMOL/L (ref 8–16)
AST SERPL-CCNC: 40 U/L (ref 10–40)
BASOPHILS # BLD AUTO: 0.05 K/UL (ref 0–0.2)
BASOPHILS NFR BLD: 0.7 % (ref 0–1.9)
BILIRUB SERPL-MCNC: 0.2 MG/DL (ref 0.1–1)
BNP SERPL-MCNC: 15 PG/ML (ref 0–99)
BUN SERPL-MCNC: 11 MG/DL (ref 6–20)
CALCIUM SERPL-MCNC: 9.3 MG/DL (ref 8.7–10.5)
CHLORIDE SERPL-SCNC: 105 MMOL/L (ref 95–110)
CO2 SERPL-SCNC: 27 MMOL/L (ref 23–29)
CREAT SERPL-MCNC: 0.9 MG/DL (ref 0.5–1.4)
DIFFERENTIAL METHOD: NORMAL
EOSINOPHIL # BLD AUTO: 0.2 K/UL (ref 0–0.5)
EOSINOPHIL NFR BLD: 2.5 % (ref 0–8)
ERYTHROCYTE [DISTWIDTH] IN BLOOD BY AUTOMATED COUNT: 12.6 % (ref 11.5–14.5)
EST. GFR  (AFRICAN AMERICAN): >60 ML/MIN/1.73 M^2
EST. GFR  (NON AFRICAN AMERICAN): >60 ML/MIN/1.73 M^2
GLUCOSE SERPL-MCNC: 84 MG/DL (ref 70–110)
HCT VFR BLD AUTO: 37.4 % (ref 37–48.5)
HGB BLD-MCNC: 12.3 G/DL (ref 12–16)
IMM GRANULOCYTES # BLD AUTO: 0.02 K/UL (ref 0–0.04)
IMM GRANULOCYTES NFR BLD AUTO: 0.3 % (ref 0–0.5)
LYMPHOCYTES # BLD AUTO: 1.9 K/UL (ref 1–4.8)
LYMPHOCYTES NFR BLD: 26.8 % (ref 18–48)
MCH RBC QN AUTO: 30.4 PG (ref 27–31)
MCHC RBC AUTO-ENTMCNC: 32.9 G/DL (ref 32–36)
MCV RBC AUTO: 92 FL (ref 82–98)
MONOCYTES # BLD AUTO: 0.5 K/UL (ref 0.3–1)
MONOCYTES NFR BLD: 6.5 % (ref 4–15)
NEUTROPHILS # BLD AUTO: 4.5 K/UL (ref 1.8–7.7)
NEUTROPHILS NFR BLD: 63.2 % (ref 38–73)
NRBC BLD-RTO: 0 /100 WBC
PLATELET # BLD AUTO: 248 K/UL (ref 150–450)
PMV BLD AUTO: 9.6 FL (ref 9.2–12.9)
POTASSIUM SERPL-SCNC: 4.1 MMOL/L (ref 3.5–5.1)
PROT SERPL-MCNC: 7.4 G/DL (ref 6–8.4)
RBC # BLD AUTO: 4.05 M/UL (ref 4–5.4)
SODIUM SERPL-SCNC: 140 MMOL/L (ref 136–145)
TROPONIN I SERPL DL<=0.01 NG/ML-MCNC: <0.006 NG/ML (ref 0–0.03)
WBC # BLD AUTO: 7.13 K/UL (ref 3.9–12.7)

## 2021-05-19 PROCEDURE — 96361 HYDRATE IV INFUSION ADD-ON: CPT

## 2021-05-19 PROCEDURE — 93005 ELECTROCARDIOGRAM TRACING: CPT

## 2021-05-19 PROCEDURE — 93010 ELECTROCARDIOGRAM REPORT: CPT | Mod: ,,, | Performed by: INTERNAL MEDICINE

## 2021-05-19 PROCEDURE — 96360 HYDRATION IV INFUSION INIT: CPT

## 2021-05-19 PROCEDURE — 25000003 PHARM REV CODE 250: Performed by: NURSE PRACTITIONER

## 2021-05-19 PROCEDURE — 85025 COMPLETE CBC W/AUTO DIFF WBC: CPT | Performed by: NURSE PRACTITIONER

## 2021-05-19 PROCEDURE — 84484 ASSAY OF TROPONIN QUANT: CPT | Performed by: NURSE PRACTITIONER

## 2021-05-19 PROCEDURE — 83880 ASSAY OF NATRIURETIC PEPTIDE: CPT | Performed by: NURSE PRACTITIONER

## 2021-05-19 PROCEDURE — 80053 COMPREHEN METABOLIC PANEL: CPT | Performed by: NURSE PRACTITIONER

## 2021-05-19 PROCEDURE — 93010 EKG 12-LEAD: ICD-10-PCS | Mod: ,,, | Performed by: INTERNAL MEDICINE

## 2021-05-19 PROCEDURE — 99285 EMERGENCY DEPT VISIT HI MDM: CPT | Mod: 25

## 2021-05-19 RX ORDER — GABAPENTIN 300 MG/1
CAPSULE ORAL
COMMUNITY
End: 2023-11-01 | Stop reason: CLARIF

## 2021-05-19 RX ORDER — ERGOCALCIFEROL 1.25 MG/1
CAPSULE ORAL
COMMUNITY
Start: 2020-08-10 | End: 2023-11-01 | Stop reason: CLARIF

## 2021-05-19 RX ADMIN — SODIUM CHLORIDE 500 ML: 0.9 INJECTION, SOLUTION INTRAVENOUS at 06:05

## 2021-05-20 ENCOUNTER — PES CALL (OUTPATIENT)
Dept: ADMINISTRATIVE | Facility: CLINIC | Age: 41
End: 2021-05-20

## 2021-08-20 ENCOUNTER — OFFICE VISIT (OUTPATIENT)
Dept: CARDIOLOGY | Facility: CLINIC | Age: 41
End: 2021-08-20
Payer: MEDICAID

## 2021-08-20 VITALS
WEIGHT: 234.81 LBS | SYSTOLIC BLOOD PRESSURE: 128 MMHG | OXYGEN SATURATION: 98 % | HEIGHT: 67 IN | HEART RATE: 73 BPM | RESPIRATION RATE: 15 BRPM | DIASTOLIC BLOOD PRESSURE: 66 MMHG | BODY MASS INDEX: 36.85 KG/M2

## 2021-08-20 DIAGNOSIS — R07.9 CHEST PAIN, UNSPECIFIED TYPE: Primary | ICD-10-CM

## 2021-08-20 PROCEDURE — 99999 PR PBB SHADOW E&M-EST. PATIENT-LVL IV: ICD-10-PCS | Mod: PBBFAC,,, | Performed by: INTERNAL MEDICINE

## 2021-08-20 PROCEDURE — 93010 EKG 12-LEAD: ICD-10-PCS | Mod: S$PBB,,, | Performed by: INTERNAL MEDICINE

## 2021-08-20 PROCEDURE — 93010 ELECTROCARDIOGRAM REPORT: CPT | Mod: S$PBB,,, | Performed by: INTERNAL MEDICINE

## 2021-08-20 PROCEDURE — 99999 PR PBB SHADOW E&M-EST. PATIENT-LVL IV: CPT | Mod: PBBFAC,,, | Performed by: INTERNAL MEDICINE

## 2021-08-20 PROCEDURE — 99214 OFFICE O/P EST MOD 30 MIN: CPT | Mod: PBBFAC | Performed by: INTERNAL MEDICINE

## 2021-08-20 PROCEDURE — 99203 OFFICE O/P NEW LOW 30 MIN: CPT | Mod: S$PBB,,, | Performed by: INTERNAL MEDICINE

## 2021-08-20 PROCEDURE — 99203 PR OFFICE/OUTPT VISIT, NEW, LEVL III, 30-44 MIN: ICD-10-PCS | Mod: S$PBB,,, | Performed by: INTERNAL MEDICINE

## 2021-08-20 PROCEDURE — 93005 ELECTROCARDIOGRAM TRACING: CPT | Mod: PBBFAC | Performed by: INTERNAL MEDICINE

## 2021-09-13 ENCOUNTER — HOSPITAL ENCOUNTER (OUTPATIENT)
Dept: CARDIOLOGY | Facility: HOSPITAL | Age: 41
Discharge: HOME | End: 2021-09-13
Attending: INTERNAL MEDICINE
Payer: MEDICAID

## 2021-09-13 DIAGNOSIS — R07.9 CHEST PAIN, UNSPECIFIED TYPE: ICD-10-CM

## 2021-09-13 LAB
ASCENDING AORTA: 2.78 CM
AV INDEX (PROSTH): 0.79
AV MEAN GRADIENT: 4 MMHG
AV PEAK GRADIENT: 7 MMHG
AV VALVE AREA: 2.8 CM2
AV VELOCITY RATIO: 0.86
CV ECHO LV RWT: 0.43 CM
CV STRESS BASE HR: 65 BPM
DIASTOLIC BLOOD PRESSURE: 73 MMHG
DOP CALC AO PEAK VEL: 1.28 M/S
DOP CALC AO VTI: 26.93 CM
DOP CALC LVOT AREA: 3.6 CM2
DOP CALC LVOT DIAMETER: 2.13 CM
DOP CALC LVOT PEAK VEL: 1.1 M/S
DOP CALC LVOT STROKE VOLUME: 75.43 CM3
DOP CALCLVOT PEAK VEL VTI: 21.18 CM
E WAVE DECELERATION TIME: 209.21 MSEC
E/A RATIO: 1.85
E/E' RATIO: 6.43 M/S
ECHO LV POSTERIOR WALL: 1.07 CM (ref 0.6–1.1)
EJECTION FRACTION: 55 %
FRACTIONAL SHORTENING: 42 % (ref 28–44)
INTERVENTRICULAR SEPTUM: 1.11 CM (ref 0.6–1.1)
IVRT: 83.04 MSEC
LA MAJOR: 4.97 CM
LA MINOR: 5.65 CM
LA WIDTH: 4.7 CM
LEFT ATRIUM SIZE: 3.52 CM
LEFT ATRIUM VOLUME: 74.37 CM3
LEFT INTERNAL DIMENSION IN SYSTOLE: 2.87 CM (ref 2.1–4)
LEFT VENTRICLE DIASTOLIC VOLUME: 116.03 ML
LEFT VENTRICLE SYSTOLIC VOLUME: 31.53 ML
LEFT VENTRICULAR INTERNAL DIMENSION IN DIASTOLE: 4.96 CM (ref 3.5–6)
LEFT VENTRICULAR MASS: 201.92 G
LV LATERAL E/E' RATIO: 6.17 M/S
LV SEPTAL E/E' RATIO: 6.73 M/S
MV PEAK A VEL: 0.4 M/S
MV PEAK E VEL: 0.74 M/S
MV STENOSIS PRESSURE HALF TIME: 60.67 MS
MV VALVE AREA P 1/2 METHOD: 3.63 CM2
OHS CV CPX 1 MINUTE RECOVERY HEART RATE: 139 BPM
OHS CV CPX 85 PERCENT MAX PREDICTED HEART RATE MALE: 143
OHS CV CPX ESTIMATED METS: 1070
OHS CV CPX MAX PREDICTED HEART RATE: 168
OHS CV CPX PATIENT IS FEMALE: 1
OHS CV CPX PATIENT IS MALE: 0
OHS CV CPX PEAK DIASTOLIC BLOOD PRESSURE: 43 MMHG
OHS CV CPX PEAK HEAR RATE: 153 BPM
OHS CV CPX PEAK RATE PRESSURE PRODUCT: NORMAL
OHS CV CPX PEAK SYSTOLIC BLOOD PRESSURE: 153 MMHG
OHS CV CPX PERCENT MAX PREDICTED HEART RATE ACHIEVED: 91
OHS CV CPX RATE PRESSURE PRODUCT PRESENTING: 7345
PISA TR MAX VEL: 2.32 M/S
PULM VEIN S/D RATIO: 1.3
PV PEAK D VEL: 0.46 M/S
PV PEAK S VEL: 0.6 M/S
PV PEAK VELOCITY: 0.99 CM/S
RA MAJOR: 5.1 CM
RA PRESSURE: 3 MMHG
RA WIDTH: 4.21 CM
RIGHT VENTRICULAR END-DIASTOLIC DIMENSION: 3.46 CM
RV TISSUE DOPPLER FREE WALL SYSTOLIC VELOCITY 1 (APICAL 4 CHAMBER VIEW): 16.18 CM/S
SINUS: 3.33 CM
STJ: 2.86 CM
STRESS ECHO POST EXERCISE DUR MIN: 9 MINUTES
STRESS ECHO POST EXERCISE DUR SEC: 22 SECONDS
SYSTOLIC BLOOD PRESSURE: 113 MMHG
TDI LATERAL: 0.12 M/S
TDI SEPTAL: 0.11 M/S
TDI: 0.12 M/S
TR MAX PG: 22 MMHG
TRICUSPID ANNULAR PLANE SYSTOLIC EXCURSION: 3.23 CM
TV REST PULMONARY ARTERY PRESSURE: 25 MMHG

## 2021-09-13 PROCEDURE — 93018 CV STRESS TEST I&R ONLY: CPT | Mod: ,,, | Performed by: INTERNAL MEDICINE

## 2021-09-13 PROCEDURE — 93016 CV STRESS TEST SUPVJ ONLY: CPT | Mod: ,,, | Performed by: INTERNAL MEDICINE

## 2021-09-13 PROCEDURE — 93306 TTE W/DOPPLER COMPLETE: CPT

## 2021-09-13 PROCEDURE — 93016 EXERCISE STRESS - EKG (CUPID ONLY): ICD-10-PCS | Mod: ,,, | Performed by: INTERNAL MEDICINE

## 2021-09-13 PROCEDURE — 93306 ECHO (CUPID ONLY): ICD-10-PCS | Mod: 26,,, | Performed by: INTERNAL MEDICINE

## 2021-09-13 PROCEDURE — 93018 EXERCISE STRESS - EKG (CUPID ONLY): ICD-10-PCS | Mod: ,,, | Performed by: INTERNAL MEDICINE

## 2021-09-13 PROCEDURE — 93306 TTE W/DOPPLER COMPLETE: CPT | Mod: 26,,, | Performed by: INTERNAL MEDICINE

## 2021-09-22 ENCOUNTER — OFFICE VISIT (OUTPATIENT)
Dept: CARDIOLOGY | Facility: CLINIC | Age: 41
End: 2021-09-22
Payer: MEDICAID

## 2021-09-22 VITALS
HEIGHT: 67 IN | DIASTOLIC BLOOD PRESSURE: 66 MMHG | WEIGHT: 231.5 LBS | BODY MASS INDEX: 36.34 KG/M2 | SYSTOLIC BLOOD PRESSURE: 128 MMHG | RESPIRATION RATE: 15 BRPM | OXYGEN SATURATION: 98 % | HEART RATE: 65 BPM

## 2021-09-22 DIAGNOSIS — E66.9 NON MORBID OBESITY, UNSPECIFIED OBESITY TYPE: ICD-10-CM

## 2021-09-22 DIAGNOSIS — R07.9 CHEST PAIN, UNSPECIFIED TYPE: Primary | ICD-10-CM

## 2021-09-22 PROCEDURE — 99213 PR OFFICE/OUTPT VISIT, EST, LEVL III, 20-29 MIN: ICD-10-PCS | Mod: S$PBB,,, | Performed by: INTERNAL MEDICINE

## 2021-09-22 PROCEDURE — 99213 OFFICE O/P EST LOW 20 MIN: CPT | Mod: S$PBB,,, | Performed by: INTERNAL MEDICINE

## 2021-09-22 PROCEDURE — 99999 PR PBB SHADOW E&M-EST. PATIENT-LVL IV: ICD-10-PCS | Mod: PBBFAC,,, | Performed by: INTERNAL MEDICINE

## 2021-09-22 PROCEDURE — 99999 PR PBB SHADOW E&M-EST. PATIENT-LVL IV: CPT | Mod: PBBFAC,,, | Performed by: INTERNAL MEDICINE

## 2021-09-22 PROCEDURE — 99214 OFFICE O/P EST MOD 30 MIN: CPT | Mod: PBBFAC | Performed by: INTERNAL MEDICINE

## 2022-01-03 ENCOUNTER — HOSPITAL ENCOUNTER (OUTPATIENT)
Dept: RADIOLOGY | Facility: HOSPITAL | Age: 42
Discharge: HOME | End: 2022-01-03
Attending: INTERNAL MEDICINE
Payer: MEDICAID

## 2022-01-03 DIAGNOSIS — Z12.39 ENCOUNTER FOR BREAST CANCER SCREENING OTHER THAN MAMMOGRAM: ICD-10-CM

## 2022-01-03 PROCEDURE — 77067 SCR MAMMO BI INCL CAD: CPT | Mod: TC

## 2022-01-03 PROCEDURE — 77067 MAMMO DIGITAL SCREENING BILAT WITH TOMO: ICD-10-PCS | Mod: 26,,, | Performed by: RADIOLOGY

## 2022-01-03 PROCEDURE — 77067 SCR MAMMO BI INCL CAD: CPT | Mod: 26,,, | Performed by: RADIOLOGY

## 2022-01-03 PROCEDURE — 77063 MAMMO DIGITAL SCREENING BILAT WITH TOMO: ICD-10-PCS | Mod: 26,,, | Performed by: RADIOLOGY

## 2022-01-03 PROCEDURE — 77063 BREAST TOMOSYNTHESIS BI: CPT | Mod: 26,,, | Performed by: RADIOLOGY

## 2022-03-17 DIAGNOSIS — M25.531 PAIN IN RIGHT WRIST: ICD-10-CM

## 2022-03-17 DIAGNOSIS — M25.531 PAIN IN RIGHT WRIST: Primary | ICD-10-CM

## 2022-03-21 ENCOUNTER — CLINICAL SUPPORT (OUTPATIENT)
Dept: REHABILITATION | Facility: HOSPITAL | Age: 42
End: 2022-03-21
Payer: MEDICAID

## 2022-03-21 DIAGNOSIS — M25.531 PAIN IN RIGHT WRIST: ICD-10-CM

## 2022-03-21 DIAGNOSIS — G89.29 CHRONIC PAIN OF RIGHT THUMB: ICD-10-CM

## 2022-03-21 DIAGNOSIS — Z78.9 IMPAIRED INSTRUMENTAL ACTIVITIES OF DAILY LIVING (IADL): ICD-10-CM

## 2022-03-21 DIAGNOSIS — M79.644 CHRONIC PAIN OF RIGHT THUMB: ICD-10-CM

## 2022-03-21 PROCEDURE — 97166 OT EVAL MOD COMPLEX 45 MIN: CPT | Mod: PN

## 2022-03-21 NOTE — PLAN OF CARE
[facility] Occupational Therapy   Hand/Wrist Evaluation      Patient: Rob Hernández  Date of Evaluation: 03/21/2022  Referring Physician: Federico Pina*  Medical Diagnosis: M25.531 (ICD-10-CM) - Pain in right wrist  Therapy Diagnosis:   Encounter Diagnoses   Name Primary?    Pain in right wrist     Impaired instrumental activities of daily living (IADL)     Chronic pain of right thumb      Authorization Expiration Date: TBD  Total Visits Authorized: 1 for eval   DOI: Insidious onset approx. 1 week ago- intermittent R CTS for 2 years   Referral Orders: Eval and treat  Plan of Care Certification Period: 3/22/22-5/2/22  Progress Note Due: 4/22/22  FOTO: Initial evaluation     Visit #: 1/1; 4 visits on POC (excluding eval)   Start Time: 10:15  End Time: 10:45  Total Billable Time: 30 min    Precautions: standard     Past Medical History:   Diagnosis Date    Torn ligament     left shoulder    Torn meniscus      Current Outpatient Medications   Medication Sig    BIOTIN ORAL Take by mouth.    dexamethasone (DECADRON) 0.75 MG Tab TK 1 T PO  BID    ergocalciferol (ERGOCALCIFEROL) 50,000 unit Cap ergocalciferol (vitamin D2) 1,250 mcg (50,000 unit) capsule   Take 1 capsule every week by oral route.    gabapentin (NEURONTIN) 300 MG capsule gabapentin 300 mg capsule   TAKE 1 CAPSULE BY MOUTH AT BEDTIME AS NEEDED    hydrocodone-acetaminophen 5-325mg (NORCO) 5-325 mg per tablet Take 1 tablet by mouth every 6 (six) hours as needed for Pain.    ibuprofen (ADVIL,MOTRIN) 800 MG tablet Take 1 tablet (800 mg total) by mouth 3 (three) times daily.    ibuprofen (ADVIL,MOTRIN) 800 MG tablet Take 1 tablet (800 mg total) by mouth every 8 (eight) hours as needed for Pain.    omeprazole (PRILOSEC) 40 MG capsule TK ONE C PO QAM 1 HOUR B MEALS UTD    ondansetron (ZOFRAN-ODT) 4 MG TbDL Take 1 tablet (4 mg total) by mouth every 8 (eight) hours as needed.     No current facility-administered  medications for this visit.     Review of patient's allergies indicates:  No Known Allergies    Imaging Reports:  X-Ray Results: Ordered but not yet performed     Subjective/Occupational Profile   Age: 41 y.o.  Sex: female    Rob Hernández is a right-hand dominant female who presents to Outpatient Occupational Therapy for evaluation. Pt reports that she is unsure how she injured her R wrist- but reports that she may have injured it while performing chair dips per working out approx. 1 week.  Pt reports previous history of R CTS, intermittent for several years.     Home/Environmental History: Pt resides w/ family member.     Assistance level to complete ADLs:   Feeding: Modified Independent   Bathing: Modified Independent   Toileting: Independent   Ambulating: Independent   Grooming: Independent   Dressing upper body: Modified Independent   Dressing lower body: Modified Independent   Meal preparation: SBA   Taking/Managing medications: Independent    Work History: Pt works as a      Driving Status: Pt is able to drive self (I)     Activity Level: Lightly active     Date/Mechanism of Injury: Insidious onset approx. 1 week ago     Involved areas: right wrist    Functional Pain Scale Rating 0-10:   At Rest:  3/10   With Activity:  3/10     Rob's goals for therapy are: Decreased pain, Decreased edema, Decreased hypersensitivity and Increased functional use      Objective     Hand dominance: right  Observation: Skin intact and Skin dry  Sensation: Median Nerve Impaired  Enterprise Rasheeda Monofilament Test: No    Edema: Circumferential measurements: as follows:   Location: Wrist  Proximal Wrist Crease: 18.5 cm (L: 18.5 cm)     Range of Motion: right active  - R wrist and digit ROM are WNL and symmetrical     Manual Muscle Test:   Muscle Strength    Median Innervated:  FCR              4/5    Ulnar Innervated:  FCU                  4    Radial Nerve Innervated:  ECRL/B       4/5   ECU     "        4/5       Strength: (JORDY Dynamometer in lbs.) Average 3 trials, Position II  Right: 106 lbs  Left: 95 lbs    Problem List: functional limitations: Patient presents with the following functional limitations:   Self Care / ADL: opening jars   Work/Activities/Household management tasks: cooking, cleaning, driving bus   Leisure: working out     Treatment       Home Exercise Program/Education:  Issued HEP (see patient instructions in EMR) and educated on modality use for pain management .  Rob demonstrated good  understanding of the education provided.      Patient/Family Education: role of OT, goals for OT, scheduling/cancellations - Pt verbalized understanding. Discussed insurance limitations with patient.      Outcome Measure: FOTO  CMS Impairment/Limitation/Restriction for FOTO  Upper Extremity Survey    Therapist reviewed FOTO scores for Rob Hernández on 3/21/2022.   FOTO documents entered into EPIC - see Media section.    Limitation Score: 33%  Category: Self Care           History Examination Decision Making Complexity Score   Occupational Profile:   Pt's full occupational profile reviewed , including OD(surgical notes), including report of previous therapies.    Medical and Therapy History:     Please see "Plan" section for reference of therapy goals.    Pt with Hx/o  - See Subjective/Occupational Profile     Brief/expanded review:  Expanded              Performance Deficits     Physical    Please see under "problem list" and the assessment portion of this eval note      Cognitive  WNL      Psychosocial:    Pt with ability to work at this time using just one or both hand.    Rating: mild  Treatment to include :  paraffin, fluidotherapy, manual therapy/joint mobilizations,scar massage,   modalities for pain management, iontophoresis with dexamethasone, US 3mhz, therapeutic exercises/activities,        strengthening,       Clinical decision  Making of moderate  complexity, mild  " modifications for required to complete eval      Rating:moderate Moderate in combination of the 3 categories      Problem List:   Decreased function of Right UE, Increased pain, Inability to perform work/tasks and Inability to perform leisure activiites    Assessment   Rob Hernández was referred to Outpatient Occupational Therapy with diagnosis of   Encounter Diagnoses   Name Primary?    Pain in right wrist     Impaired instrumental activities of daily living (IADL)     Chronic pain of right thumb     presents with the functional limitations as described in the problem list above. Patient can benefit from Outpatient Occupational Therapy services to restore maximum functional use of her dominant hand to facilitate performance of ADLs and IADLs. The following goals were discussed with the Patient and she is in agreement with them as to be addressed in the treatment plan.     Anticipated barriers to Occupational Therapy: None noted       Pt has no cultural, educational or language barriers to learning provided.      ShortTerm Goals (to be met in 2 weeks or by 4/4/22):   1. Patient to be IND and compliant with HEP & attendance for duration of therapy.  2. Patient will report no more than 3/10 pain in R hand/wrist.   3. Patient will report increase in functional use of R hand/wrist by 5%10% as evidenced by the FOTO outcome measure.       Long Term Goals (to be met in 4 weeks or by DC):   1. Patient to be IND and compliant with HEP & attendance for duration of therapy.  2. Patient will report increase in functional use of R hand/wrist by 10%-15% as evidenced by the FOTO outcome measure.   3. Patient will report no more than 1/10 pain in R hand/wrist.   4. Patient will be (I) w/ all ADLs and IADLs.       Plan   Rob Hernández is to be treated by Occupational Therapy 1 time per week for 4 weeks, or 4 visits, during the certification period from 3/21/22 to 5/2/22, to achieve the established goals.        Treatment to include: Paraffin/hot packs, manual therapy/joint mobilizations, modalities for pain management, joint protection, energy conservation, therapeutic exercises, therapeutic activities, and implementation of a personalized Home Exercise Program (HEP), as well as any other treatments deemed necessary based on the patient's needs or progress.       Thank you for allowing me to assist in the care of your Patient. If you have any questions or concerns, please don't hesitate to e-mail or contact me directly.      BALDOMERO Kraft MOT   [facility]   Phone: [phone]  Fax: [phone]      I certify the need for these services furnished under this plan of treatment and while under my care                                                                            Referring practitoner/provider       Date

## 2022-03-31 NOTE — PROGRESS NOTES
"                            Outpatient Occupational Therapy Daily Treatment Note       Date: 4/4/2022  Name: Rob Lewis [place]  Clinic Number: [id number]    Therapy Diagnosis:   Encounter Diagnoses   Name Primary?    Pain in right wrist Yes    Impaired instrumental activities of daily living (IADL)     Chronic pain of right thumb     Numbness of right hand     Carpal tunnel syndrome on right      Physician: Federico Pina*    Physician Orders: Eval and treat   Medical Diagnosis: M25.531 (ICD-10-CM) - Pain in right wrist  DOI: Insidious onset approx. 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 I guess I overdo it with my hand."     Pt was compliant with home exercise program given last session.   Response to previous treatment:favorable     Pain: pre-session:0/10 ; post session: 0/10   Location: right ulnar wrist and dorsum of hand (radial aspect)       Objective    Pt received the following supervised modalities after being cleared for contradictions for 8 minutes:   -Fluidotherapy to R hand/wrist for 8 minutes to increase blood flow, circulation, desensitization, sensory re-education and for pain management. Performed the following therex within fluidotherapy:  -AROM for wrist flex/ext       Pt received the following direct contact modalities after being cleared for contraindications for 8 " minutes:  -Utrasound for scar tissue remodeling, increased circulation, & tissue elasticity @ 100% duty cycle, 3.3 Mhz, applied to R volar wrist/thumb, intensity = 0.6 w/cm2        Therapist performed the following manual therapy techniques to increase joint mobilization and soft tissue mobilization for 10 minutes:   -Soft tissue massage (STM) and myofascial release (MFR) to R hand/wrist to increase joint mobility, ROM and for pain management.        Pt was instructed on the following dynamic therapeutic exercises to restore functional performance while increasing strength, endurance, ROM, and flexibility for 14 minutes (as part of HEP), including:  -Web space release x 5 min   - Prayer stretch x 3 min   - Education provided on home management of recurrent CTS and tendonitis         Home Exercises and Education Provided     Education provided:  - Discussed insurance limitation  - Progress towards goals  - Pt instructed on importance of compliance w/ RICE and HEP       Written Home Exercises Provided: Yes.   Exercises were reviewed and Rob was able to demonstrate them prior to the end of the session.  Rob demonstrated good  understanding of the HEP provided.     See EMR under Patient Instructions for exercises provided 4/4/22.       Assessment     Pt would continue to benefit from skilled Occupational Therapy services. Pt tolerated session well, noting good understanding of all education provided. Therapist re-iterated the importance of compliance w/ RICE method to allow for healing of inflamed tendons and of the median nerve.      Rob is progressing fairly towards her goals and there are no updates to goals at this time. Pt prognosis is Good.     Pt demonstrated proper understanding of each exercise. Pt required verbal and tactile cues for correct performance of HEP.  Pt continues to be limited in functional and leisurely pursuits. Pain limits Pts participation in ADL's and IADLs. Pt is not able  to carryout necessary vocational tasks. Pt continues to requires cues and skilled supervision to complete HEP.     Anticipated barriers to Occupational Therapy: None noted     Pt's spiritual, cultural and educational needs considered and Pt agreeable to Plan of Care and goals.    ShortTerm Goals (to be met in 2 weeks or by 4/4/22):   1. Patient to be IND and compliant with HEP & attendance for duration of therapy.  2. Patient will report no more than 3/10 pain in R hand/wrist.   3. Patient will report increase in functional use of R hand/wrist by 5%10% as evidenced by the FOTO outcome measure.         Long Term Goals (to be met in 4 weeks or by DC):   1. Patient to be IND and compliant with HEP & attendance for duration of therapy.  2. Patient will report increase in functional use of R hand/wrist by 10%-15% as evidenced by the FOTO outcome measure.   3. Patient will report no more than 1/10 pain in R hand/wrist.   4. Patient will be (I) w/ all ADLs and IADLs.      Mike Hernández is to be treated by Occupational Therapy 1 time per week for 4 weeks, or 4 visits, during the certification period from 3/21/22 to 5/2/22, to achieve the established goals.        Updates/Grading for next session: Re-Assessment       Thank you for allowing me to assist in the care of your Patient. If you have any questions or concerns, please don't hesitate to e-mail or contact me directly.      BALDOMERO Kraft MOT  [facility]   Phone: [phone]  Fax: [phone]

## 2022-04-04 ENCOUNTER — CLINICAL SUPPORT (OUTPATIENT)
Dept: REHABILITATION | Facility: HOSPITAL | Age: 42
End: 2022-04-04
Payer: MEDICAID

## 2022-04-04 DIAGNOSIS — G89.29 CHRONIC PAIN OF RIGHT THUMB: ICD-10-CM

## 2022-04-04 DIAGNOSIS — Z78.9 IMPAIRED INSTRUMENTAL ACTIVITIES OF DAILY LIVING (IADL): ICD-10-CM

## 2022-04-04 DIAGNOSIS — M25.531 PAIN IN RIGHT WRIST: Primary | ICD-10-CM

## 2022-04-04 DIAGNOSIS — G56.01 CARPAL TUNNEL SYNDROME ON RIGHT: ICD-10-CM

## 2022-04-04 DIAGNOSIS — M79.644 CHRONIC PAIN OF RIGHT THUMB: ICD-10-CM

## 2022-04-04 DIAGNOSIS — R20.0 NUMBNESS OF RIGHT HAND: ICD-10-CM

## 2022-04-04 PROCEDURE — 97530 THERAPEUTIC ACTIVITIES: CPT | Mod: PN

## 2022-04-07 NOTE — PROGRESS NOTES
"               Outpatient Occupational Therapy Progress & Daily Treatment Note       Date: 4/11/2022  Name: Rob Hernández  Clinic Number: [id number]    Therapy Diagnosis:   Encounter Diagnoses   Name Primary?    Pain in right wrist Yes    Impaired instrumental activities of daily living (IADL)     Chronic pain of right thumb     Numbness of right hand     Carpal tunnel syndrome on right      Physician: Federico Pina*    Physician Orders: Eval and treat   Medical Diagnosis: M25.531 (ICD-10-CM) - Pain in right wrist  DOI: Insidious onset approx. 1 week ago- intermittent R CTS for 2 years   Insurance Authorization Period Expiration: 7/3/22  Plan of Care Certification Period: 3/21/22-5/2/22  Date of Return to MD: ONUR  Date of Evaluation: 3/21/22   Progress Note Due: 4/22/22  FOTO: Initial evaluation     Visit # / Visits authorized: 2/4 visits on POC (excluding eval)/ 8 visits authorized   Time In: 16:50  Time Out: 17:35  Total Billable Time: 38 minutes  Total Treatment Time: 45 minutes    Precautions:  Standard      Subjective     Functional Change: Patient (Pt) reports, "I used the ice pack, but fell asleep with it on- it freezed my thumb out- I haven't used it since then. I know I wasn't supposed to leave it on that long."     Pt reports: "I wear the brace at night and when I'm driving. I guess I overdo it with my hand."     Pt was not compliant with home exercise program given last session.   Response to previous treatment: N/A    Pain: pre-session:0/10 ; post session: 0/10   Location: right ulnar wrist and dorsum of hand (radial aspect)       Objective    Brief re-assessment performed w/ goals updated x 15 min     Outcome Measure: FOTO  CMS Impairment/Limitation/Restriction for FOTO  Upper Extremity Survey     Therapist reviewed FOTO scores for Rob Hernández on 4/11/22  FOTO documents entered into Rep - see Media section.     Limitation Score: 2% (31% improvement)   Category: " Self Care           Pt received the following supervised modalities after being cleared for contradictions for 6 minutes:   -Ice pack application to R hand/wrist x 6 minutes to decrease pain and inflammation        Pt received the following direct contact modalities after being cleared for contraindications for 8 minutes:  -Utrasound for scar tissue remodeling, increased circulation, & tissue elasticity @ 100% duty cycle, 3.3 Mhz, applied to R volar wrist/thumb, intensity = 0.6 w/cm2        Therapist performed the following manual therapy techniques to increase joint mobilization and soft tissue mobilization for 16 minutes:   -Soft tissue massage (STM) and myofascial release (MFR) to R hand/wrist to increase joint mobility, ROM and for pain management.       Home Exercises and Education Provided     Education provided:  - Progress towards goals  - Review of all previous HEP   - Pt instructed on importance of compliance w/ RICE and HEP       Written Home Exercises Provided: Continue w/ previous HEP.  Exercises were reviewed and Rob was able to demonstrate them prior to the end of the session.  Rob demonstrated good  understanding of the HEP provided.     See EMR under Patient Instructions for exercises provided 4/4/22.       Assessment     Pt has met 2 out of 3 STG established for therapy noting significant decrease in wrist/forearm pain reported since initial evaluation w/ partial compliance reported with bracing and HEP. Pt would continue to benefit from skilled Occupational Therapy services.    Rob is progressing well towards her goals and there have been updates to goals at this time. Pt prognosis is Good.     Pt demonstrated proper understanding of each exercise. Pt required verbal and tactile cues for correct performance of HEP.  Pt continues to be limited in functional and leisurely pursuits. Pain limits Pts participation in ADL's and IADLs. Pt is not able to carryout necessary vocational  tasks. Pt continues to requires cues and skilled supervision to complete HEP.     Anticipated barriers to Occupational Therapy: None noted     Pt's spiritual, cultural and educational needs considered and Pt agreeable to Plan of Care and goals.    ShortTerm Goals (to be met in 2 weeks or by 4/4/22):   1. Patient to be IND and compliant with HEP & attendance for duration of therapy. In progress   2. Patient will report no more than 3/10 pain in R hand/wrist. Goal Met 4/11  3. Patient will report increase in functional use of R hand/wrist by 5%10% as evidenced by the FOTO outcome measure. Goal Met 4/11        Long Term Goals (to be met in 4 weeks or by DC):   1. Patient to be IND and compliant with HEP & attendance for duration of therapy.  2. Patient will report increase in functional use of R hand/wrist by 10%-15% as evidenced by the FOTO outcome measure.   3. Patient will report no more than 1/10 pain in R hand/wrist.   4. Patient will be (I) w/ all ADLs and IADLs.      Plan     Rob Hernández is to be treated by Occupational Therapy 1 time per week for 4 weeks, or 4 visits, during the certification period from 3/21/22 to 5/2/22, to achieve the established goals.        Updates/Grading for next session: ONUR       Thank you for allowing me to assist in the care of your Patient. If you have any questions or concerns, please don't hesitate to e-mail or contact me directly.      BALDOMERO Kraft MOT  [facility]   Phone: [phone]  Fax: [phone]

## 2022-04-11 ENCOUNTER — CLINICAL SUPPORT (OUTPATIENT)
Dept: REHABILITATION | Facility: HOSPITAL | Age: 42
End: 2022-04-11
Payer: MEDICAID

## 2022-04-11 DIAGNOSIS — Z78.9 IMPAIRED INSTRUMENTAL ACTIVITIES OF DAILY LIVING (IADL): ICD-10-CM

## 2022-04-11 DIAGNOSIS — M25.531 PAIN IN RIGHT WRIST: Primary | ICD-10-CM

## 2022-04-11 DIAGNOSIS — R20.0 NUMBNESS OF RIGHT HAND: ICD-10-CM

## 2022-04-11 DIAGNOSIS — G89.29 CHRONIC PAIN OF RIGHT THUMB: ICD-10-CM

## 2022-04-11 DIAGNOSIS — G56.01 CARPAL TUNNEL SYNDROME ON RIGHT: ICD-10-CM

## 2022-04-11 DIAGNOSIS — M79.644 CHRONIC PAIN OF RIGHT THUMB: ICD-10-CM

## 2022-04-11 PROCEDURE — 97530 THERAPEUTIC ACTIVITIES: CPT | Mod: PN

## 2022-04-18 ENCOUNTER — DOCUMENTATION ONLY (OUTPATIENT)
Dept: REHABILITATION | Facility: HOSPITAL | Age: 42
End: 2022-04-18
Payer: MEDICAID

## 2022-04-18 DIAGNOSIS — Z78.9 IMPAIRED INSTRUMENTAL ACTIVITIES OF DAILY LIVING (IADL): Primary | ICD-10-CM

## 2022-04-18 DIAGNOSIS — M79.644 CHRONIC PAIN OF RIGHT THUMB: ICD-10-CM

## 2022-04-18 DIAGNOSIS — G89.29 CHRONIC PAIN OF RIGHT THUMB: ICD-10-CM

## 2022-04-18 NOTE — PROGRESS NOTES
"               Outpatient Occupational Therapy Discharge Summary        Date: 4/25/2022  Name: Rob Hernández  Clinic Number: [id number]    Therapy Diagnosis:   Encounter Diagnoses   Name Primary?    Impaired instrumental activities of daily living (IADL) Yes    Chronic pain of right thumb     Pain in right wrist     Numbness of right hand     Carpal tunnel syndrome on right      Physician: Federico Pina*    Physician Orders: Eval and treat   Medical Diagnosis: M25.531 (ICD-10-CM) - Pain in right wrist  DOI: Insidious onset approx. 1 week ago- intermittent R CTS for 2 years   Insurance Authorization Period Expiration: 7/3/22  Plan of Care Certification Period: 3/21/22-5/2/22  Date of Return to MD: ONUR  Date of Evaluation: 3/21/22   Progress Note Due: 4/22/22  FOTO: Initial evaluation     Visit # / Visits authorized: 3/4 visits on POC (excluding eval) / 8 visits authorized   Time In: 10:45  Time Out: 11:25  Total Billable Time: 40 minutes  Total Treatment Time: 40 minutes    Precautions:  Standard    Subjective     Functional Change: Patient (Pt) reports, "I don't feel functionally limited by my hand with anything- I just get random pains sometimes and some numbness & tingling occasionally."     Pt reports: "I get sharp pains through the top & side (ulnar) of my hand- it's not all the time though."     Pt was mostly compliant with home exercise program given last session.   Response to previous treatment: N/A    Pain: pre-session:0/10 ; post session: 0/10   Location: right ulnar wrist and dorsum of hand (radial aspect)       Objective     · Brief re-assessment performed w/ goals updated x 15 min      Outcome Measure: FOTO  CMS Impairment/Limitation/Restriction for FOTO  Upper Extremity Survey     Therapist reviewed FOTO scores for Rob Hernández on 4/11/22  FOTO documents entered into [employer] - see Media section.     Limitation Score: 2% (31% improvement)   Category: Self Care    "      Manual Muscle Test:   Muscle Strength     Median Innervated:  FCR              5/5     Ulnar Innervated:  FCU                  5/5     Radial Nerve Innervated:  ECRL/B       5/5   ECU            5/5         Strength: (JORDY Dynamometer in lbs.) Average 3 trials, Position II  Right: 106 lbs  Left: 95 lbs      · Therapist performed the following manual therapy techniques to increase joint mobilization and soft tissue mobilization for 10 minutes:   -Soft tissue massage (STM) and myofascial release (MFR) to R hand/wrist to increase joint mobility, ROM and for pain management.       Home Exercises and Education Provided     Education provided x 15 minutes   - Progress towards goals  - Review of all previous HEP, modality use, and energy conservation techniques to reduce carpal tunnel sydrome-related symptoms.     Written Home Exercises Provided: Continue w/ previous HEP.  Exercises were reviewed and Rob was able to demonstrate them prior to the end of the session.  Rob demonstrated good  understanding of the HEP provided.     See EMR under Patient Instructions for exercises provided 4/4/22 and 4/25/22.       Assessment     Pt has met most LTG established for therapy, w/ reports of occasional dorsal/ulnar sided R hand pain with certain activities. Overall, Pt demonstrates normative  and isolated wrist/forearm strength, and does not feel limited with any self-care or work-related tasks due to occasional hand pain or paresthesias.       Pt's spiritual, cultural and educational needs considered and Pt agreeable to Plan of Care and goals.    ShortTerm Goals (to be met in 2 weeks or by 4/4/22):   1. Patient to be IND and compliant with HEP & attendance for duration of therapy. In progress   2. Patient will report no more than 3/10 pain in R hand/wrist. Goal Met 4/11  3. Patient will report increase in functional use of R hand/wrist by 5%10% as evidenced by the FOTO outcome measure. Goal Met  4/11        Long Term Goals (to be met in 4 weeks or by DC):   1. Patient to be IND and compliant with HEP & attendance for duration of therapy. Goal Met   2. Patient will report increase in functional use of R hand/wrist by 10%-15% as evidenced by the FOTO outcome measure. Goal Met 4/11  3. Patient will report no more than 1/10 pain in R hand/wrist. Not Met   4. Patient will be (I) w/ all ADLs and IADLs. Goal Met 4/25      Plan     Patient is appropriate for discharge from Outpatient Occupational Therapy and will continue at home with HEP.       Thank you for allowing me to assist in the care of your Patient. If you have any questions or concerns, please don't hesitate to e-mail or contact me directly.      BALDOMERO Kraft MOT  [facility]   Phone: [phone]  Fax: [phone]

## 2022-04-18 NOTE — PROGRESS NOTES
Occupational Therapy Missed Visit     Name: Rob Hernández  Date: 04/18/2022  Medical Diagnosis:   Encounter Diagnoses   Name Primary?    Impaired instrumental activities of daily living (IADL) Yes    Chronic pain of right thumb      Referring Physician: Federico Pina*    Patient (Pt) did not attend previously scheduled OT visit today due to personal engagement/schedule conflict. Pt cancelled 5 minutes prior to scheduled appointment time. Pt reminded of clinic's no show/same day cancellation policy. No charges have been posted today.     0 No show   1 Same day cancellations     BALDOMERO Kraft MOT  [facility]   Phone: [phone]

## 2022-04-25 ENCOUNTER — CLINICAL SUPPORT (OUTPATIENT)
Dept: REHABILITATION | Facility: HOSPITAL | Age: 42
End: 2022-04-25
Payer: MEDICAID

## 2022-04-25 DIAGNOSIS — M79.644 CHRONIC PAIN OF RIGHT THUMB: ICD-10-CM

## 2022-04-25 DIAGNOSIS — G89.29 CHRONIC PAIN OF RIGHT THUMB: ICD-10-CM

## 2022-04-25 DIAGNOSIS — R20.0 NUMBNESS OF RIGHT HAND: ICD-10-CM

## 2022-04-25 DIAGNOSIS — G56.01 CARPAL TUNNEL SYNDROME ON RIGHT: ICD-10-CM

## 2022-04-25 DIAGNOSIS — M25.531 PAIN IN RIGHT WRIST: ICD-10-CM

## 2022-04-25 DIAGNOSIS — Z78.9 IMPAIRED INSTRUMENTAL ACTIVITIES OF DAILY LIVING (IADL): Primary | ICD-10-CM

## 2022-04-25 PROCEDURE — 97530 THERAPEUTIC ACTIVITIES: CPT | Mod: PN

## 2023-03-24 DIAGNOSIS — Z12.39 ENCOUNTER FOR OTHER SCREENING FOR MALIGNANT NEOPLASM OF BREAST: Primary | ICD-10-CM

## 2023-03-24 DIAGNOSIS — R13.10 PROBLEMS WITH SWALLOWING AND MASTICATION: ICD-10-CM

## 2023-04-04 ENCOUNTER — HOSPITAL ENCOUNTER (OUTPATIENT)
Dept: RADIOLOGY | Facility: HOSPITAL | Age: 43
Discharge: HOME | End: 2023-04-04
Attending: INTERNAL MEDICINE
Payer: MEDICAID

## 2023-04-04 VITALS — HEIGHT: 67 IN | WEIGHT: 231 LBS | BODY MASS INDEX: 36.26 KG/M2

## 2023-04-04 DIAGNOSIS — R13.10 PROBLEMS WITH SWALLOWING AND MASTICATION: ICD-10-CM

## 2023-04-04 DIAGNOSIS — Z12.39 ENCOUNTER FOR OTHER SCREENING FOR MALIGNANT NEOPLASM OF BREAST: ICD-10-CM

## 2023-04-04 PROCEDURE — 74230 X-RAY XM SWLNG FUNCJ C+: CPT | Mod: 26,,, | Performed by: RADIOLOGY

## 2023-04-04 PROCEDURE — 77063 MAMMO DIGITAL SCREENING BILAT WITH TOMO: ICD-10-PCS | Mod: 26,,, | Performed by: RADIOLOGY

## 2023-04-04 PROCEDURE — 77067 MAMMO DIGITAL SCREENING BILAT WITH TOMO: ICD-10-PCS | Mod: 26,,, | Performed by: RADIOLOGY

## 2023-04-04 PROCEDURE — 77067 SCR MAMMO BI INCL CAD: CPT | Mod: 26,,, | Performed by: RADIOLOGY

## 2023-04-04 PROCEDURE — 74230 FL MODIFIED BARIUM SWALLOW SPEECH STUDY: ICD-10-PCS | Mod: 26,,, | Performed by: RADIOLOGY

## 2023-04-04 PROCEDURE — 77067 SCR MAMMO BI INCL CAD: CPT | Mod: TC

## 2023-04-04 PROCEDURE — 92611 MOTION FLUOROSCOPY/SWALLOW: CPT

## 2023-04-04 PROCEDURE — 74230 X-RAY XM SWLNG FUNCJ C+: CPT | Mod: TC

## 2023-04-04 PROCEDURE — A9698 NON-RAD CONTRAST MATERIALNOC: HCPCS | Performed by: INTERNAL MEDICINE

## 2023-04-04 PROCEDURE — 77063 BREAST TOMOSYNTHESIS BI: CPT | Mod: 26,,, | Performed by: RADIOLOGY

## 2023-04-04 PROCEDURE — 25500020 PHARM REV CODE 255: Performed by: INTERNAL MEDICINE

## 2023-04-04 PROCEDURE — 97535 SELF CARE MNGMENT TRAINING: CPT

## 2023-04-04 RX ADMIN — BARIUM SULFATE 60 ML: 0.81 POWDER, FOR SUSPENSION ORAL at 10:04

## 2023-04-04 NOTE — PROCEDURES
"Modified Barium Swallow    Patient Name:  Rob Hernández   MRN:  [id number]      Recommendations:     Recommendations:                General Recommendations:  GI evaluation   Diet recommendations: Regular with thin  Aspiration Precautions: alternate bites and sips with intake of grainy foods.   General Precautions: Standard,    Communication strategies:  none    Referral     Reason for Referral  Patient was referred for a Modified Barium Swallow Study to assess the efficiency of his/her swallow function, rule out aspiration and make recommendations regarding safe dietary consistencies, effective compensatory strategies, and safe eating environment. Pt reporting periodic choking specifically on grainy foods and variable sensation of mid-esophageal stasis post swallow of solids. She denies significant issues swallowing liquids.     Diagnosis:       History:     Past Medical History:   Diagnosis Date    Torn ligament     left shoulder    Torn meniscus        Objective:     Current Respiratory Status:  room air    Alert: yes    Cooperative: yes    Follows Directions: yes    Visualization  Patient was seen in the lateral view    Oral Peripheral Examination   WFL    Consistencies Assessed  Thin X1 via straw, multiple trials via self presented straw  Puree X3  Solids X2    Oral Preparation/Oral Phase  WFL- Pt with adequate bolus acceptance, containment, control and timely A-P transfer across consistencies   Functional premature spillage of puree and solids to valleculae  Pt with c/o of swallow apraxia reporting "it's stuck in my throat" white bolus was still in oral cavity prior to swallow initiation. During this episode she was cued to initiated swallow which she was able to do, no significant pharyngeal post swallow.   Piece meal deglutition     Pharyngeal Phase   WFL- Base of tongue retraction with resulting epiglottal inversion/elevation and vestibular closure was adequate for airway protection and bolus " efficiency across trials and consistencies.     Specific Results as follows:  THIN/PUREE/SOLIDS  Rosenbek 8-Point Penetration-Aspiration Scale Score: 1: Material does not enter the airway.     Pharyngeal Residue  Valleculae: no significant  Pyriforms: no significant  ----    Cervical Esophageal Phase  UES appeared to accommodate all bolus types without stasis or retrograde movement observed   Bolus was noted to slow somewhat but was not obstructed at luis level of  C6-7 secondary to osteophytes. A micro volume of puree stasis was noted at this level inconsistently. It is possible that grainer foods may result in some stasis at this level contributing to the patient complaints, stasis cleared via subsequent intake of swallow.     Assessment:     Impressions   Pt presents with trace oral dysphagia c/b behavioral apraxia of swallow, no significant pharyngeal dysphagia, possible esophageal component.     Prognosis: good     Barriers:  Cervical changes     Plan  GI consult    Education  Education provided with monitor in Pt's view.     Time Tracking:   SLP Treatment Date:    4/4/20  Speech Start Time:   1037  Speech Stop Time:      1115  Speech Total Time (min):   38 minutes (15 MBS, 23 self care)    04/04/2023

## 2023-11-01 ENCOUNTER — HOSPITAL ENCOUNTER (OUTPATIENT)
Facility: HOSPITAL | Age: 43
Discharge: HOME | End: 2023-11-02
Attending: EMERGENCY MEDICINE | Admitting: STUDENT IN AN ORGANIZED HEALTH CARE EDUCATION/TRAINING PROGRAM
Payer: COMMERCIAL

## 2023-11-01 DIAGNOSIS — R07.9 CHEST PAIN: ICD-10-CM

## 2023-11-01 DIAGNOSIS — R53.83 FATIGUE, UNSPECIFIED TYPE: ICD-10-CM

## 2023-11-01 DIAGNOSIS — R51.9 NONINTRACTABLE HEADACHE, UNSPECIFIED CHRONICITY PATTERN, UNSPECIFIED HEADACHE TYPE: ICD-10-CM

## 2023-11-01 DIAGNOSIS — D64.9 ANEMIA, UNSPECIFIED TYPE: Primary | ICD-10-CM

## 2023-11-01 DIAGNOSIS — D64.9 SYMPTOMATIC ANEMIA: ICD-10-CM

## 2023-11-01 DIAGNOSIS — R06.02 SOB (SHORTNESS OF BREATH): ICD-10-CM

## 2023-11-01 LAB
ABO + RH BLD: NORMAL
BASOPHILS # BLD AUTO: 0.05 K/UL (ref 0–0.2)
BASOPHILS NFR BLD: 0.6 % (ref 0–1.9)
BLD GP AB SCN CELLS X3 SERPL QL: NORMAL
BLD PROD TYP BPU: NORMAL
BLOOD UNIT EXPIRATION DATE: NORMAL
BLOOD UNIT TYPE CODE: 7300
BLOOD UNIT TYPE: NORMAL
CODING SYSTEM: NORMAL
CROSSMATCH INTERPRETATION: NORMAL
CTP QC/QA: YES
CTP QC/QA: YES
DIFFERENTIAL METHOD: ABNORMAL
DISPENSE STATUS: NORMAL
EOSINOPHIL # BLD AUTO: 0.3 K/UL (ref 0–0.5)
EOSINOPHIL NFR BLD: 3.9 % (ref 0–8)
ERYTHROCYTE [DISTWIDTH] IN BLOOD BY AUTOMATED COUNT: 20.6 % (ref 11.5–14.5)
FERRITIN SERPL-MCNC: 4 NG/ML (ref 20–300)
HCT VFR BLD AUTO: 26 % (ref 37–48.5)
HGB BLD-MCNC: 7.4 G/DL (ref 12–16)
IMM GRANULOCYTES # BLD AUTO: 0.02 K/UL (ref 0–0.04)
IMM GRANULOCYTES NFR BLD AUTO: 0.2 % (ref 0–0.5)
LYMPHOCYTES # BLD AUTO: 2.2 K/UL (ref 1–4.8)
LYMPHOCYTES NFR BLD: 26.9 % (ref 18–48)
MCH RBC QN AUTO: 19.4 PG (ref 27–31)
MCHC RBC AUTO-ENTMCNC: 28.5 G/DL (ref 32–36)
MCV RBC AUTO: 68 FL (ref 82–98)
MONOCYTES # BLD AUTO: 0.6 K/UL (ref 0.3–1)
MONOCYTES NFR BLD: 7.5 % (ref 4–15)
NEUTROPHILS # BLD AUTO: 5 K/UL (ref 1.8–7.7)
NEUTROPHILS NFR BLD: 60.9 % (ref 38–73)
NRBC BLD-RTO: 0 /100 WBC
PLATELET # BLD AUTO: 316 K/UL (ref 150–450)
PMV BLD AUTO: 8.9 FL (ref 9.2–12.9)
POC MOLECULAR INFLUENZA A AGN: NEGATIVE
POC MOLECULAR INFLUENZA B AGN: NEGATIVE
RBC # BLD AUTO: 3.81 M/UL (ref 4–5.4)
SARS-COV-2 RDRP RESP QL NAA+PROBE: NEGATIVE
SPECIMEN OUTDATE: NORMAL
TRANS ERYTHROCYTES VOL PATIENT: NORMAL ML
WBC # BLD AUTO: 8.25 K/UL (ref 3.9–12.7)

## 2023-11-01 PROCEDURE — G0378 HOSPITAL OBSERVATION PER HR: HCPCS

## 2023-11-01 PROCEDURE — 86920 COMPATIBILITY TEST SPIN: CPT | Performed by: EMERGENCY MEDICINE

## 2023-11-01 PROCEDURE — 36430 TRANSFUSION BLD/BLD COMPNT: CPT

## 2023-11-01 PROCEDURE — 96374 THER/PROPH/DIAG INJ IV PUSH: CPT

## 2023-11-01 PROCEDURE — 99285 EMERGENCY DEPT VISIT HI MDM: CPT | Mod: 25

## 2023-11-01 PROCEDURE — 86901 BLOOD TYPING SEROLOGIC RH(D): CPT | Performed by: EMERGENCY MEDICINE

## 2023-11-01 PROCEDURE — 63600175 PHARM REV CODE 636 W HCPCS: Performed by: PHYSICIAN ASSISTANT

## 2023-11-01 PROCEDURE — P9021 RED BLOOD CELLS UNIT: HCPCS | Performed by: EMERGENCY MEDICINE

## 2023-11-01 PROCEDURE — 87635 SARS-COV-2 COVID-19 AMP PRB: CPT | Performed by: PHYSICIAN ASSISTANT

## 2023-11-01 PROCEDURE — 82728 ASSAY OF FERRITIN: CPT

## 2023-11-01 PROCEDURE — 87502 INFLUENZA DNA AMP PROBE: CPT

## 2023-11-01 PROCEDURE — 85025 COMPLETE CBC W/AUTO DIFF WBC: CPT | Performed by: PHYSICIAN ASSISTANT

## 2023-11-01 RX ORDER — TALC
6 POWDER (GRAM) TOPICAL NIGHTLY PRN
Status: DISCONTINUED | OUTPATIENT
Start: 2023-11-01 | End: 2023-11-02 | Stop reason: HOSPADM

## 2023-11-01 RX ORDER — IBUPROFEN 200 MG
24 TABLET ORAL
Status: DISCONTINUED | OUTPATIENT
Start: 2023-11-01 | End: 2023-11-02 | Stop reason: HOSPADM

## 2023-11-01 RX ORDER — ACETAMINOPHEN 325 MG/1
650 TABLET ORAL EVERY 4 HOURS PRN
Status: DISCONTINUED | OUTPATIENT
Start: 2023-11-01 | End: 2023-11-02 | Stop reason: HOSPADM

## 2023-11-01 RX ORDER — LANOLIN ALCOHOL/MO/W.PET/CERES
800 CREAM (GRAM) TOPICAL
Status: DISCONTINUED | OUTPATIENT
Start: 2023-11-01 | End: 2023-11-02 | Stop reason: HOSPADM

## 2023-11-01 RX ORDER — IBUPROFEN 200 MG
16 TABLET ORAL
Status: DISCONTINUED | OUTPATIENT
Start: 2023-11-01 | End: 2023-11-02 | Stop reason: HOSPADM

## 2023-11-01 RX ORDER — SODIUM CHLORIDE 0.9 % (FLUSH) 0.9 %
10 SYRINGE (ML) INJECTION EVERY 12 HOURS PRN
Status: DISCONTINUED | OUTPATIENT
Start: 2023-11-01 | End: 2023-11-02 | Stop reason: HOSPADM

## 2023-11-01 RX ORDER — AMOXICILLIN 250 MG
1 CAPSULE ORAL 2 TIMES DAILY PRN
Status: DISCONTINUED | OUTPATIENT
Start: 2023-11-01 | End: 2023-11-02 | Stop reason: HOSPADM

## 2023-11-01 RX ORDER — NALOXONE HCL 0.4 MG/ML
0.02 VIAL (ML) INJECTION
Status: DISCONTINUED | OUTPATIENT
Start: 2023-11-01 | End: 2023-11-02 | Stop reason: HOSPADM

## 2023-11-01 RX ORDER — PROCHLORPERAZINE EDISYLATE 5 MG/ML
5 INJECTION INTRAMUSCULAR; INTRAVENOUS EVERY 6 HOURS PRN
Status: DISCONTINUED | OUTPATIENT
Start: 2023-11-01 | End: 2023-11-02 | Stop reason: HOSPADM

## 2023-11-01 RX ORDER — KETOROLAC TROMETHAMINE 30 MG/ML
15 INJECTION, SOLUTION INTRAMUSCULAR; INTRAVENOUS
Status: COMPLETED | OUTPATIENT
Start: 2023-11-01 | End: 2023-11-01

## 2023-11-01 RX ORDER — ONDANSETRON 8 MG/1
8 TABLET, ORALLY DISINTEGRATING ORAL EVERY 8 HOURS PRN
Status: DISCONTINUED | OUTPATIENT
Start: 2023-11-01 | End: 2023-11-02 | Stop reason: HOSPADM

## 2023-11-01 RX ORDER — GLUCAGON 1 MG
1 KIT INJECTION
Status: DISCONTINUED | OUTPATIENT
Start: 2023-11-01 | End: 2023-11-02 | Stop reason: HOSPADM

## 2023-11-01 RX ADMIN — KETOROLAC TROMETHAMINE 15 MG: 30 INJECTION, SOLUTION INTRAMUSCULAR; INTRAVENOUS at 06:11

## 2023-11-01 NOTE — ED TRIAGE NOTES
"Pt. Reports she has left sided headaches with neck pain. Pt. Reports reports she has been having pain for a " while".pt. reports she is a  and believes this is causing her neck and shoulder pain.   Pt. States she was told in the the past that her H&H is at 8/28. Pt. Denies taking any iron meds and was told by her PCP that she will need an infusion. Pt. Reports she believes her levels have dropped.   "

## 2023-11-01 NOTE — LETTER
November 2, 2023         Kenneth SCHERER  [facility]  [place] [address]  Phone: [phone]  Fax: [phone]       Patient: Rob Hernández   YOB: 1980  Date of Visit: 11/01/2023 -11/02/2023    To Whom It May Concern:    Emily Hernández  was at [facility] on 11/01/2023 - 11/02/2023.  If you have any questions or concerns, or if I can be of further assistance, please do not hesitate to contact me.    Sincerely,    Monika Cagle RN

## 2023-11-02 VITALS
TEMPERATURE: 99 F | OXYGEN SATURATION: 99 % | DIASTOLIC BLOOD PRESSURE: 74 MMHG | BODY MASS INDEX: 39.87 KG/M2 | SYSTOLIC BLOOD PRESSURE: 117 MMHG | HEART RATE: 68 BPM | WEIGHT: 254 LBS | HEIGHT: 67 IN | RESPIRATION RATE: 18 BRPM

## 2023-11-02 PROBLEM — E66.01 SEVERE OBESITY (BMI >= 40): Status: ACTIVE | Noted: 2023-11-02

## 2023-11-02 PROBLEM — D50.9 IRON DEFICIENCY ANEMIA: Status: ACTIVE | Noted: 2023-11-02

## 2023-11-02 LAB
ALBUMIN SERPL BCP-MCNC: 3.2 G/DL (ref 3.5–5.2)
ALP SERPL-CCNC: 64 U/L (ref 55–135)
ALT SERPL W/O P-5'-P-CCNC: 7 U/L (ref 10–44)
ANION GAP SERPL CALC-SCNC: 6 MMOL/L (ref 8–16)
AST SERPL-CCNC: 15 U/L (ref 10–40)
BASOPHILS # BLD AUTO: 0.03 K/UL (ref 0–0.2)
BASOPHILS NFR BLD: 0.3 % (ref 0–1.9)
BILIRUB SERPL-MCNC: 0.5 MG/DL (ref 0.1–1)
BUN SERPL-MCNC: 7 MG/DL (ref 6–20)
CALCIUM SERPL-MCNC: 8.5 MG/DL (ref 8.7–10.5)
CHLORIDE SERPL-SCNC: 110 MMOL/L (ref 95–110)
CO2 SERPL-SCNC: 24 MMOL/L (ref 23–29)
CREAT SERPL-MCNC: 0.8 MG/DL (ref 0.5–1.4)
DIFFERENTIAL METHOD: ABNORMAL
EOSINOPHIL # BLD AUTO: 0.3 K/UL (ref 0–0.5)
EOSINOPHIL NFR BLD: 2.7 % (ref 0–8)
ERYTHROCYTE [DISTWIDTH] IN BLOOD BY AUTOMATED COUNT: 21.2 % (ref 11.5–14.5)
EST. GFR  (NO RACE VARIABLE): >60 ML/MIN/1.73 M^2
FOLATE SERPL-MCNC: 7.3 NG/ML (ref 4–24)
GLUCOSE SERPL-MCNC: 91 MG/DL (ref 70–110)
HCT VFR BLD AUTO: 28.8 % (ref 37–48.5)
HGB BLD-MCNC: 8.4 G/DL (ref 12–16)
IMM GRANULOCYTES # BLD AUTO: 0.04 K/UL (ref 0–0.04)
IMM GRANULOCYTES NFR BLD AUTO: 0.4 % (ref 0–0.5)
IRON SERPL-MCNC: 103 UG/DL (ref 30–160)
LYMPHOCYTES # BLD AUTO: 1.1 K/UL (ref 1–4.8)
LYMPHOCYTES NFR BLD: 11.2 % (ref 18–48)
MCH RBC QN AUTO: 20.5 PG (ref 27–31)
MCHC RBC AUTO-ENTMCNC: 29.2 G/DL (ref 32–36)
MCV RBC AUTO: 70 FL (ref 82–98)
MONOCYTES # BLD AUTO: 0.5 K/UL (ref 0.3–1)
MONOCYTES NFR BLD: 4.7 % (ref 4–15)
NEUTROPHILS # BLD AUTO: 7.9 K/UL (ref 1.8–7.7)
NEUTROPHILS NFR BLD: 80.7 % (ref 38–73)
NRBC BLD-RTO: 0 /100 WBC
PLATELET # BLD AUTO: 294 K/UL (ref 150–450)
PMV BLD AUTO: 9 FL (ref 9.2–12.9)
POTASSIUM SERPL-SCNC: 4.2 MMOL/L (ref 3.5–5.1)
PROT SERPL-MCNC: 6.7 G/DL (ref 6–8.4)
RBC # BLD AUTO: 4.09 M/UL (ref 4–5.4)
SATURATED IRON: 22 % (ref 20–50)
SODIUM SERPL-SCNC: 140 MMOL/L (ref 136–145)
TOTAL IRON BINDING CAPACITY: 477 UG/DL (ref 250–450)
TRANSFERRIN SERPL-MCNC: 322 MG/DL (ref 200–375)
VIT B12 SERPL-MCNC: 304 PG/ML (ref 210–950)
WBC # BLD AUTO: 9.82 K/UL (ref 3.9–12.7)

## 2023-11-02 PROCEDURE — G0378 HOSPITAL OBSERVATION PER HR: HCPCS

## 2023-11-02 PROCEDURE — 82607 VITAMIN B-12: CPT | Performed by: STUDENT IN AN ORGANIZED HEALTH CARE EDUCATION/TRAINING PROGRAM

## 2023-11-02 PROCEDURE — 36430 TRANSFUSION BLD/BLD COMPNT: CPT

## 2023-11-02 PROCEDURE — 36415 COLL VENOUS BLD VENIPUNCTURE: CPT | Performed by: STUDENT IN AN ORGANIZED HEALTH CARE EDUCATION/TRAINING PROGRAM

## 2023-11-02 PROCEDURE — 83540 ASSAY OF IRON: CPT | Performed by: STUDENT IN AN ORGANIZED HEALTH CARE EDUCATION/TRAINING PROGRAM

## 2023-11-02 PROCEDURE — 85025 COMPLETE CBC W/AUTO DIFF WBC: CPT | Performed by: STUDENT IN AN ORGANIZED HEALTH CARE EDUCATION/TRAINING PROGRAM

## 2023-11-02 PROCEDURE — 82746 ASSAY OF FOLIC ACID SERUM: CPT | Performed by: STUDENT IN AN ORGANIZED HEALTH CARE EDUCATION/TRAINING PROGRAM

## 2023-11-02 PROCEDURE — 84466 ASSAY OF TRANSFERRIN: CPT | Performed by: STUDENT IN AN ORGANIZED HEALTH CARE EDUCATION/TRAINING PROGRAM

## 2023-11-02 PROCEDURE — 80053 COMPREHEN METABOLIC PANEL: CPT

## 2023-11-02 RX ORDER — AMOXICILLIN 250 MG
1 CAPSULE ORAL 2 TIMES DAILY PRN
Qty: 60 TABLET | Refills: 0 | Status: SHIPPED | OUTPATIENT
Start: 2023-11-02 | End: 2023-12-02

## 2023-11-02 RX ORDER — FERROUS SULFATE 325(65) MG
325 TABLET ORAL
Qty: 30 TABLET | Refills: 0 | Status: SHIPPED | OUTPATIENT
Start: 2023-11-02 | End: 2023-12-02

## 2023-11-02 NOTE — PLAN OF CARE
SW notified nurse that patient is ready for discharge from case management standpoint.        11/02/23 1017   Final Note   Assessment Type Final Discharge Note   Anticipated Discharge Disposition Home   What phone number can be called within the next 1-3 days to see how you are doing after discharge? [phone]   Post-Acute Status   Post-Acute Authorization Other   Other Status No Post-Acute Service Needs   Discharge Delays None known at this time

## 2023-11-02 NOTE — ED PROVIDER NOTES
Encounter Date: 11/1/2023       History     Chief Complaint   Patient presents with    Headache     Pt states she was told by PCP to come and get lab work, in April hemoglobin was 8. Does have chronic anemia and is concerned her labs may have dropped even more. Also reports constant HA x2 weeks, denies blurred vision, dizziness, n/v nothing has given relief.     lab work     43-year-old female with a past medical history of anemia presents complaining of fatigue for 2 weeks.  Associated left-sided headache and intermittent shortness of breath.  Patient states her primary care physician suggested she come to the ER to have her hemoglobin checked.  Patient states she had a colonoscopy and a EGD recently and the results were negative.  They have not figured out where she is bleeding from.  Patient does report heavy menstrual cycles.        Review of patient's allergies indicates:  No Known Allergies  Past Medical History:   Diagnosis Date    Torn ligament     left shoulder    Torn meniscus      Past Surgical History:   Procedure Laterality Date    INNER EAR SURGERY      INNER EAR SURGERY      left knee surg      right shoulder surg      TUBAL LIGATION      VAGINAL DELIVERY      x3     Family History   Problem Relation Age of Onset    Hypertension Mother     Breast cancer Paternal Aunt      Social History     Tobacco Use    Smoking status: Never    Smokeless tobacco: Never   Substance Use Topics    Alcohol use: Yes     Comment: occ    Drug use: Yes     Types: Marijuana     Review of Systems   Constitutional:  Positive for fatigue.   HENT:  Negative for ear pain.    Respiratory:  Positive for shortness of breath.    Gastrointestinal:  Negative for abdominal pain.   Musculoskeletal:  Negative for back pain.   Neurological:  Positive for headaches.   Psychiatric/Behavioral:  Negative for confusion.        Physical Exam     Initial Vitals [11/01/23 1706]   BP Pulse Resp Temp SpO2   128/60 75 18 98.5 °F (36.9 °C) 98 %       MAP       --         Physical Exam    Constitutional: She appears well-developed and well-nourished.   HENT:   Right Ear: External ear normal.   Left Ear: External ear normal.   Mouth/Throat: Oropharynx is clear and moist.   Eyes: Conjunctivae and EOM are normal. Pupils are equal, round, and reactive to light.   Neck: Neck supple.   Normal range of motion.  Cardiovascular:  Normal rate, regular rhythm, normal heart sounds and intact distal pulses.           Pulmonary/Chest: Breath sounds normal.   Abdominal: Abdomen is soft. Bowel sounds are normal.   Musculoskeletal:         General: Normal range of motion.      Cervical back: Normal range of motion and neck supple.     Neurological: She is oriented to person, place, and time. She has normal strength.   Skin: Skin is warm.   Psychiatric: She has a normal mood and affect. Thought content normal.         ED Course   Critical Care    Date/Time: 11/1/2023 10:33 PM    Performed by: Earle Boyd DNP  Authorized by: Ion Benson MD  Direct patient critical care time: 5 minutes  Additional history critical care time: 5 minutes  Ordering / reviewing critical care time: 5 minutes  Documentation critical care time: 5 minutes  Consulting other physicians critical care time: 5 minutes  Consult with family critical care time: 5 minutes  Total critical care time (exclusive of procedural time) : 30 minutes  Critical care time was exclusive of separately billable procedures and treating other patients and teaching time.  Critical care was necessary to treat or prevent imminent or life-threatening deterioration of the following conditions: circulatory failure and respiratory failure.  Critical care was time spent personally by me on the following activities: re-evaluation of patient's condition, ordering and review of laboratory studies, ordering and performing treatments and interventions, obtaining history from patient or surrogate, examination of patient, evaluation of  patient's response to treatment and discussions with primary provider.        Labs Reviewed   CBC W/ AUTO DIFFERENTIAL - Abnormal; Notable for the following components:       Result Value    RBC 3.81 (*)     Hemoglobin 7.4 (*)     Hematocrit 26.0 (*)     MCV 68 (*)     MCH 19.4 (*)     MCHC 28.5 (*)     RDW 20.6 (*)     MPV 8.9 (*)     All other components within normal limits   FERRITIN   IRON AND TIBC   SARS-COV-2 RDRP GENE   POCT INFLUENZA A/B MOLECULAR   TYPE & SCREEN   PREPARE RBC SOFT          Imaging Results              X-Ray Chest PA And Lateral (Final result)  Result time 11/01/23 20:14:38      Final result by Clayton Tavera MD (11/01/23 20:14:38)                   Impression:      No acute cardiopulmonary process identified.      Electronically signed by: Clayton Tavera MD  Date:    11/01/2023  Time:    20:14               Narrative:    EXAMINATION:  XR CHEST PA AND LATERAL    CLINICAL HISTORY:  Shortness of breath    TECHNIQUE:  PA and lateral views of the chest were performed.    COMPARISON:  May 2021.    FINDINGS:  Cardiac silhouette is normal in size.  Lungs are symmetrically expanded.  No evidence of focal consolidative process, pneumothorax, or significant pleural effusion.  No acute osseous abnormality identified.                                       CT Head Without Contrast (Final result)  Result time 11/01/23 18:26:39      Final result by Rene Morales MD (11/01/23 18:26:39)                   Impression:      No acute intracranial abnormality.  Specifically, no intracranial hemorrhage.      Electronically signed by: Rene Morales MD  Date:    11/01/2023  Time:    18:26               Narrative:    EXAMINATION:  CT HEAD WITHOUT CONTRAST    CLINICAL HISTORY:  Headache, new or worsening, neuro deficit (Age 19-49y);    TECHNIQUE:  Low dose axial CT images obtained throughout the head without intravenous contrast. Sagittal and coronal reconstructions were  performed.    COMPARISON:  None.    FINDINGS:  Intracranial compartment: Brain appears normally formed.    Ventricles and sulci are normal in size for age without evidence of hydrocephalus. No extra-axial blood or fluid collections.    The brain parenchyma appears normal. No parenchymal mass, hemorrhage, edema or major vascular distribution infarct.    Skull/extracranial contents (limited evaluation): No fracture. Mastoid air cells and paranasal sinuses are essentially clear.                                       Medications   sodium chloride 0.9% flush 10 mL (has no administration in time range)   naloxone 0.4 mg/mL injection 0.02 mg (has no administration in time range)   glucose chewable tablet 16 g (has no administration in time range)   glucose chewable tablet 24 g (has no administration in time range)   glucagon (human recombinant) injection 1 mg (has no administration in time range)   magnesium oxide tablet 800 mg (has no administration in time range)   magnesium oxide tablet 800 mg (has no administration in time range)   acetaminophen tablet 650 mg (has no administration in time range)   senna-docusate 8.6-50 mg per tablet 1 tablet (has no administration in time range)   ondansetron disintegrating tablet 8 mg (has no administration in time range)   prochlorperazine injection Soln 5 mg (has no administration in time range)   melatonin tablet 6 mg (has no administration in time range)   dextrose 10% bolus 125 mL 125 mL (has no administration in time range)   dextrose 10% bolus 250 mL 250 mL (has no administration in time range)   ketorolac injection 15 mg (15 mg Intravenous Given 11/1/23 1808)     Medical Decision Making  CT head negative for acute abnormality.  Hemoglobin has decreased to 7.4.  Patient given Toradol 15 mg IV in the ED which improved headache.  Patient states she still feels fatigued.  I suspect patient's fatigue and shortness of breath is due to severe anemia.  Patient needs to be admitted  for blood transfusion.  I consulted Medicine (Davion DAIZ) who recommended ordering 1 unit of blood and chest x-ray to rule out pneumonia.  Patient is stable for admission.  Patient care transferred to Earle DIAZ at 2020    Amount and/or Complexity of Data Reviewed  Labs: ordered. Decision-making details documented in ED Course.  Radiology: ordered. Decision-making details documented in ED Course.    Risk  Prescription drug management.               ED Course as of 11/01/23 2233 Wed Nov 01, 2023 2013 BP: 120/63 [VC]   2013 Temp: 98.7 °F (37.1 °C) [VC]   2013 Temp Source: Oral [VC]   2013 Pulse(!): 57 [VC]   2013 Resp: 18 [VC]   2013 SpO2: 100 % [VC]   2014 CT Head Without Contrast  No acute intracranial abnormality.  Specifically, no intracranial hemorrhage. [VC]   2020 X-Ray Chest PA And Lateral  No acute cardiopulmonary process identified. [VC]   2129 POC Molecular Influenza A Ag: Negative [VC]   2129 POC Molecular Influenza B Ag: Negative [VC]   2129 SARS-CoV-2 RNA, Amplification, Qual: Negative [VC]      ED Course User Index  [VC] Earle Boyd, DNP        This is Earle Boyd dictating I accepted this patient from Is[place] physician assistant at the end of her shift at or around 20:00.  At this time the patient is in no apparent distress she is resting comfortably.  Transfusion has been ordered.  I had a discussion with Davion Lynch whereby he requested a chest x-ray flu and COVID testing prior to placement and arms.  These were ordered and completed as negative.  I have given complete report to Д[facility] who is accepted the patient.  The patient has been consented for blood products.  She is amenable to being placed observation while she receives said products.  End of my dictation            Clinical Impression:   Final diagnoses:  [R06.02] SOB (shortness of breath)  [D64.9] Anemia, unspecified type (Primary)  [R51.9] Nonintractable headache, unspecified chronicity pattern,  unspecified headache type  [R53.83] Fatigue, unspecified type        ED Disposition Condition    Observation                 Earle Boyd, [facility]  11/01/23 9719

## 2023-11-02 NOTE — ASSESSMENT & PLAN NOTE
Body mass index is 40.1 kg/m². Morbid obesity complicates all aspects of disease management from diagnostic modalities to treatment. Weight loss encouraged and health benefits explained to patient.

## 2023-11-02 NOTE — SUBJECTIVE & OBJECTIVE
Past Medical History:   Diagnosis Date    Torn ligament     left shoulder    Torn meniscus        Past Surgical History:   Procedure Laterality Date    INNER EAR SURGERY      INNER EAR SURGERY      left knee surg      right shoulder surg      TUBAL LIGATION      VAGINAL DELIVERY      x3       Review of patient's allergies indicates:  No Known Allergies    No current facility-administered medications on file prior to encounter.     No current outpatient medications on file prior to encounter.     Family History       Problem Relation (Age of Onset)    Breast cancer Paternal Aunt    Hypertension Mother          Tobacco Use    Smoking status: Never    Smokeless tobacco: Never   Substance and Sexual Activity    Alcohol use: Yes     Comment: occ    Drug use: Yes     Types: Marijuana    Sexual activity: Yes     Partners: Male     Birth control/protection: Surgical     Comment: tubaligation     Review of Systems   Constitutional:  Positive for fatigue. Negative for chills, diaphoresis and fever.   HENT:  Negative for congestion and sore throat.    Eyes:  Negative for visual disturbance.   Respiratory:  Positive for shortness of breath. Negative for cough, chest tightness and wheezing.    Cardiovascular:  Negative for chest pain and leg swelling.   Gastrointestinal:  Negative for abdominal pain, diarrhea, nausea and vomiting.   Endocrine: Negative for polyuria.   Genitourinary:  Negative for dysuria, hematuria and urgency.   Musculoskeletal:  Negative for back pain and myalgias.   Skin:  Negative for rash.   Neurological:  Positive for light-headedness and headaches. Negative for dizziness, syncope and weakness.   Psychiatric/Behavioral:  Negative for confusion.      Objective:     Vital Signs (Most Recent):  Temp: 98.8 °F (37.1 °C) (11/02/23 0043)  Pulse: 69 (11/02/23 0043)  Resp: 16 (11/02/23 0043)  BP: 114/68 (11/02/23 0043)  SpO2: 100 % (11/02/23 0043) Vital Signs (24h Range):  Temp:  [98.5 °F (36.9 °C)-99.4 °F (37.4  °C)] 98.8 °F (37.1 °C)  Pulse:  [57-75] 69  Resp:  [16-18] 16  SpO2:  [96 %-100 %] 100 %  BP: (105-133)/(56-75) 114/68     Weight: 116.1 kg (256 lb)  Body mass index is 40.1 kg/m².     Physical Exam  Constitutional:       General: She is not in acute distress.     Appearance: Normal appearance. She is obese. She is not ill-appearing or diaphoretic.   HENT:      Head: Normocephalic and atraumatic.   Eyes:      Extraocular Movements: Extraocular movements intact.   Cardiovascular:      Rate and Rhythm: Normal rate and regular rhythm.      Pulses: Normal pulses.      Heart sounds: No murmur heard.     No friction rub.   Pulmonary:      Effort: Pulmonary effort is normal.      Breath sounds: Normal breath sounds. No wheezing.   Abdominal:      General: Abdomen is flat.      Palpations: Abdomen is soft.      Tenderness: There is no abdominal tenderness.   Musculoskeletal:         General: Normal range of motion.   Skin:     General: Skin is warm and dry.      Findings: No bruising.   Neurological:      General: No focal deficit present.      Mental Status: She is alert and oriented to person, place, and time. Mental status is at baseline.   Psychiatric:         Mood and Affect: Mood normal.         Behavior: Behavior normal.         Thought Content: Thought content normal.             Significant Labs: All pertinent labs within the past 24 hours have been reviewed.  CBC:   Recent Labs   Lab 11/01/23  1807   WBC 8.25   HGB 7.4*   HCT 26.0*          Significant Imaging: I have reviewed all pertinent imaging results/findings within the past 24 hours.  Imaging Results              X-Ray Chest PA And Lateral (Final result)  Result time 11/01/23 20:14:38      Final result by Clayton Tavera MD (11/01/23 20:14:38)                   Impression:      No acute cardiopulmonary process identified.      Electronically signed by: Clayton Tavera MD  Date:    11/01/2023  Time:    20:14               Narrative:     EXAMINATION:  XR CHEST PA AND LATERAL    CLINICAL HISTORY:  Shortness of breath    TECHNIQUE:  PA and lateral views of the chest were performed.    COMPARISON:  May 2021.    FINDINGS:  Cardiac silhouette is normal in size.  Lungs are symmetrically expanded.  No evidence of focal consolidative process, pneumothorax, or significant pleural effusion.  No acute osseous abnormality identified.                                       CT Head Without Contrast (Final result)  Result time 11/01/23 18:26:39      Final result by Rene Morales MD (11/01/23 18:26:39)                   Impression:      No acute intracranial abnormality.  Specifically, no intracranial hemorrhage.      Electronically signed by: Rene Morales MD  Date:    11/01/2023  Time:    18:26               Narrative:    EXAMINATION:  CT HEAD WITHOUT CONTRAST    CLINICAL HISTORY:  Headache, new or worsening, neuro deficit (Age 19-49y);    TECHNIQUE:  Low dose axial CT images obtained throughout the head without intravenous contrast. Sagittal and coronal reconstructions were performed.    COMPARISON:  None.    FINDINGS:  Intracranial compartment: Brain appears normally formed.    Ventricles and sulci are normal in size for age without evidence of hydrocephalus. No extra-axial blood or fluid collections.    The brain parenchyma appears normal. No parenchymal mass, hemorrhage, edema or major vascular distribution infarct.    Skull/extracranial contents (limited evaluation): No fracture. Mastoid air cells and paranasal sinuses are essentially clear.

## 2023-11-02 NOTE — DISCHARGE SUMMARY
[facility]  [facility] Medicine  Discharge Summary      Patient Name: Rob Hernández  MRN: [id number]  FRANK: [id number]  Patient Class: OP- Observation  Admission Date: 11/1/2023  Hospital Length of Stay: 0 days  Discharge Date and Time: 11/2/2023 10:46 AM  Attending Physician: Ion Benson MD.  Discharging Provider: Anthony Gray PA-C  Primary Care Provider: Federico Pina MD    Primary Care Team: ANTHONY GRAY    HPI:   Rob Hernández is a 43 y.o. with a pmh of chronic anemia  presents to the hospital with complaints of fatigue, lethargy, and headaches for the past 2 weeks. Patient states that she tried to sleep it off as the pain at times became too much. Patient also experienced intermittent sob and lightheadedness. Patient called her PCP who suggested to come to the ED to get blood work done. Patient is being [place] by Dr. Garrett of [place] [facility] where she states that she had her colonoscopy and EGD on 9/5/23. She stated that the results were unremarkable and the source of bleeding was not found. Patient denies fever, chest pain, abdominal pain, melena, hematemesis, hematochezia, hematuria, n/v/d, or any other associated symptoms. Patient states that she was supposed to start on iron infusion but that has not happened yet. She has regular menstrual cycles but states that they have been a little heavier than normal. She does not report heavy NSAID usage and not on any blood thinners. Patient has no known allergies.     In the ED: Patient is afebrile without leukocytosis, Hgb at 7.4 today (8.5 about 3 months ago), MCV 68, Covid and flu negative, CT head shows no acute intracranial abnormality and no hemorrhage, CXR shows no acute process. Patient was typed and screened and prepared for 1 unit of blood transfusion. Given Toradol for headaches with relief.     Case discussed with ED.     Rob Hernández will be placed under observation for further medical  management of her anemia with 1 unit of PRBC.      * No surgery found *      [facility]   [facility] for management of symptomatic anemia.    Throughout her observation stay, Ms. Hernández received 1 unit of PRBCs, without any issues or complaints.  Repeat hemoglobin on morning labs was noted to show an appropriate elevation to 8.4.  She states that she was previously on iron supplementation, which she stopped, due to issues with constipation.  I educated her on the importance of adhering to prescribed medication regimen to ensure no further episodes of symptomatic anemia.  She endorses that she has been experiencing heavier menstrual cycles, and I recommended to her that she follows up with her OBGYN as well as with her PCP and a gastroenterologist.  Ms. Hernández is medically and hemodynamically stable for discharge.  Vital signs prior to discharge are as follows:  Afebrile at 98.9° F, HR:  68 bpm, RR: 18, BP: 117/74 with oxygen saturation of 99% on RA.    All findings and plan were explained to the patient. All questions and concerns were answered. Patient verbalized understanding. Patient is in stable condition to d/c home and has been informed to follow up with her PCP within the next 7-10 days to discuss her observation stay and to follow-up with Gastroenterology and OB-GYN. Patient has been educated to return to the ED if she experiences any further symptoms of anemia, nidia/overt bleeding, chest pain, shortness of breath, lightheadness, weakness, or discomfort.       Goals of Care Treatment Preferences:  Code Status: Full Code      Consults:     No new Assessment & Plan notes have been filed under this hospital service since the last note was generated.  Service: Hospital Medicine    Final Active Diagnoses:    Diagnosis Date Noted POA    PRINCIPAL PROBLEM:  Symptomatic anemia [D64.9] 11/01/2023 Yes    Severe obesity (BMI >= 40) [E66.01] 11/02/2023 Yes     Iron deficiency anemia [D50.9] 11/02/2023 Yes      Problems Resolved During this Admission:       Discharged Condition: stable    Disposition: Home or Self Care    Follow Up:   Follow-up Information     Federico Pina MD. Schedule an appointment as soon as possible for a visit in 1 week(s).    Specialties: Internal Medicine, Pediatrics  Contact information:  [address]  [facility] [address]  [phone]                       Patient Instructions:      CBC auto differential   Standing Status: Future Standing Exp. Date: 12/31/24     Ambulatory referral/consult to Obstetrics / Gynecology   Standing Status: Future   Referral Priority: Routine Referral Type: Consultation   Referral Reason: Specialty Services Required   Requested Specialty: Obstetrics and Gynecology   Number of Visits Requested: 1     Ambulatory referral/consult to Gastroenterology   Standing Status: Future   Referral Priority: Routine Referral Type: Consultation   Referral Reason: Specialty Services Required   Requested Specialty: Gastroenterology   Number of Visits Requested: 1       Significant Diagnostic Studies: Labs:   CMP   Recent Labs   Lab 11/02/23 0701      K 4.2      CO2 24   GLU 91   BUN 7   CREATININE 0.8   CALCIUM 8.5*   PROT 6.7   ALBUMIN 3.2*   BILITOT 0.5   ALKPHOS 64   AST 15   ALT 7*   ANIONGAP 6*     CBC   Recent Labs   Lab 11/01/23  1807 11/02/23 0701   WBC 8.25 9.82   HGB 7.4* 8.4*   HCT 26.0* 28.8*    294       Pending Diagnostic Studies:     Procedure Component Value Units Date/Time    Folate [[id number]] Collected: 11/02/23 0701    Order Status: Sent Lab Status: In process Updated: 11/02/23 0701    Specimen: Blood     Vitamin B12 [[id number]] Collected: 11/02/23 0701    Order Status: Sent Lab Status: In process Updated: 11/02/23 0701    Specimen: Blood          Medications:  Reconciled Home Medications:      Medication List      START taking these medications    FeroSuL 325 mg (65 mg  iron) Tab tablet  Generic drug: ferrous sulfate  Take 1 tablet (325 mg total) by mouth daily with breakfast.     SENEXON-S 8.6-50 mg per tablet  Generic drug: senna-docusate 8.6-50 mg  Take 1 tablet by mouth 2 (two) times daily as needed for Constipation.            Indwelling Lines/Drains at time of discharge:   Lines/Drains/Airways     None                 Time spent on the discharge of patient: 55 minutes      Anthony Gray PA-C  Department of Hospital Medicine  [facility]  11/02/2023

## 2023-11-02 NOTE — ED NOTES
Blood is transfusing at this time. Iron and TIBC cant be drawn . Will notify the floor at the time of transfer

## 2023-11-02 NOTE — H&P
[facility]  [facility] Medicine  History & Physical    Patient Name: Rob Hernández  MRN: [id number]  Patient Class: OP- Observation  Admission Date: 11/1/2023  Attending Physician: Ion Benson MD   Primary Care Provider: Federico Pina MD         Patient information was obtained from patient, past medical records and ER records.     Subjective:     Principal Problem:Symptomatic anemia    Chief Complaint:   Chief Complaint   Patient presents with    Headache     Pt states she was told by PCP to come and get lab work, in April hemoglobin was 8. Does have chronic anemia and is concerned her labs may have dropped even more. Also reports constant HA x2 weeks, denies blurred vision, dizziness, n/v nothing has given relief.     lab work        HPI: Rob Hernández is a 43 y.o. with a pmh of chronic anemia  presents to the hospital with complaints of fatigue, lethargy, and headaches for the past 2 weeks. Patient states that she tried to sleep it off as the pain at times became too much. Patient also experienced intermittent sob and lightheadedness. Patient called her PCP who suggested to come to the ED to get blood work done. Patient is being followed by Dr. Garrett of GI at [facility], where she states that she had her colonoscopy and EGD on 9/5/23. She stated that the results were unremarkable and the source of bleeding was not found. Patient denies fever, chest pain, abdominal pain, melena, hematemesis, hematochezia, hematuria, n/v/d, or any other associated symptoms. Patient states that she was supposed to start on iron infusion but that has not happened yet. She has regular menstrual cycles but states that they have been a little heavier than normal. She does not report heavy NSAID usage and not on any blood thinners. Patient has no known allergies.     In the ED: Patient is afebrile without leukocytosis, Hgb at 7.4 today (8.5 about 3 months ago), MCV 68, Covid and flu negative,  CT head shows no acute intracranial abnormality and no hemorrhage, CXR shows no acute process. Patient was typed and screened and prepared for 1 unit of blood transfusion. Given Toradol for headaches with relief.     Case discussed with ED.     Rob Hernández will be placed under observation for further medical management of her anemia with 1 unit of PRBC.      Past Medical History:   Diagnosis Date    Torn ligament     left shoulder    Torn meniscus        Past Surgical History:   Procedure Laterality Date    INNER EAR SURGERY      INNER EAR SURGERY      left knee surg      right shoulder surg      TUBAL LIGATION      VAGINAL DELIVERY      x3       Review of patient's allergies indicates:  No Known Allergies    No current facility-administered medications on file prior to encounter.     No current outpatient medications on file prior to encounter.     Family History       Problem Relation (Age of Onset)    Breast cancer Paternal Aunt    Hypertension Mother          Tobacco Use    Smoking status: Never    Smokeless tobacco: Never   Substance and Sexual Activity    Alcohol use: Yes     Comment: occ    Drug use: Yes     Types: Marijuana    Sexual activity: Yes     Partners: Male     Birth control/protection: Surgical     Comment: tubaligation     Review of Systems   Constitutional:  Positive for fatigue. Negative for chills, diaphoresis and fever.   HENT:  Negative for congestion and sore throat.    Eyes:  Negative for visual disturbance.   Respiratory:  Positive for shortness of breath. Negative for cough, chest tightness and wheezing.    Cardiovascular:  Negative for chest pain and leg swelling.   Gastrointestinal:  Negative for abdominal pain, diarrhea, nausea and vomiting.   Endocrine: Negative for polyuria.   Genitourinary:  Negative for dysuria, hematuria and urgency.   Musculoskeletal:  Negative for back pain and myalgias.   Skin:  Negative for rash.   Neurological:  Positive for  light-headedness and headaches. Negative for dizziness, syncope and weakness.   Psychiatric/Behavioral:  Negative for confusion.      Objective:     Vital Signs (Most Recent):  Temp: 98.8 °F (37.1 °C) (11/02/23 0043)  Pulse: 69 (11/02/23 0043)  Resp: 16 (11/02/23 0043)  BP: 114/68 (11/02/23 0043)  SpO2: 100 % (11/02/23 0043) Vital Signs (24h Range):  Temp:  [98.5 °F (36.9 °C)-99.4 °F (37.4 °C)] 98.8 °F (37.1 °C)  Pulse:  [57-75] 69  Resp:  [16-18] 16  SpO2:  [96 %-100 %] 100 %  BP: (105-133)/(56-75) 114/68     Weight: 116.1 kg (256 lb)  Body mass index is 40.1 kg/m².     Physical Exam  Constitutional:       General: She is not in acute distress.     Appearance: Normal appearance. She is obese. She is not ill-appearing or diaphoretic.   HENT:      Head: Normocephalic and atraumatic.   Eyes:      Extraocular Movements: Extraocular movements intact.   Cardiovascular:      Rate and Rhythm: Normal rate and regular rhythm.      Pulses: Normal pulses.      Heart sounds: No murmur heard.     No friction rub.   Pulmonary:      Effort: Pulmonary effort is normal.      Breath sounds: Normal breath sounds. No wheezing.   Abdominal:      General: Abdomen is flat.      Palpations: Abdomen is soft.      Tenderness: There is no abdominal tenderness.   Musculoskeletal:         General: Normal range of motion.   Skin:     General: Skin is warm and dry.      Findings: No bruising.   Neurological:      General: No focal deficit present.      Mental Status: She is alert and oriented to person, place, and time. Mental status is at baseline.   Psychiatric:         Mood and Affect: Mood normal.         Behavior: Behavior normal.         Thought Content: Thought content normal.             Significant Labs: All pertinent labs within the past 24 hours have been reviewed.  CBC:   Recent Labs   Lab 11/01/23  1807   WBC 8.25   HGB 7.4*   HCT 26.0*          Significant Imaging: I have reviewed all pertinent imaging results/findings within  the past 24 hours.  Imaging Results              X-Ray Chest PA And Lateral (Final result)  Result time 11/01/23 20:14:38      Final result by Clayton Tavera MD (11/01/23 20:14:38)                   Impression:      No acute cardiopulmonary process identified.      Electronically signed by: Clayton Tavera MD  Date:    11/01/2023  Time:    20:14               Narrative:    EXAMINATION:  XR CHEST PA AND LATERAL    CLINICAL HISTORY:  Shortness of breath    TECHNIQUE:  PA and lateral views of the chest were performed.    COMPARISON:  May 2021.    FINDINGS:  Cardiac silhouette is normal in size.  Lungs are symmetrically expanded.  No evidence of focal consolidative process, pneumothorax, or significant pleural effusion.  No acute osseous abnormality identified.                                       CT Head Without Contrast (Final result)  Result time 11/01/23 18:26:39      Final result by Rene Morales MD (11/01/23 18:26:39)                   Impression:      No acute intracranial abnormality.  Specifically, no intracranial hemorrhage.      Electronically signed by: Rene Morales MD  Date:    11/01/2023  Time:    18:26               Narrative:    EXAMINATION:  CT HEAD WITHOUT CONTRAST    CLINICAL HISTORY:  Headache, new or worsening, neuro deficit (Age 19-49y);    TECHNIQUE:  Low dose axial CT images obtained throughout the head without intravenous contrast. Sagittal and coronal reconstructions were performed.    COMPARISON:  None.    FINDINGS:  Intracranial compartment: Brain appears normally formed.    Ventricles and sulci are normal in size for age without evidence of hydrocephalus. No extra-axial blood or fluid collections.    The brain parenchyma appears normal. No parenchymal mass, hemorrhage, edema or major vascular distribution infarct.    Skull/extracranial contents (limited evaluation): No fracture. Mastoid air cells and paranasal sinuses are essentially clear.                                     Assessment/Plan:     * Symptomatic anemia  Patient's anemia is currently uncontrolled. Etiology likely d/t acute blood loss which was from menstrual cycle, chronic blood loss and Iron deficiency  Current CBC reviewed-   Lab Results   Component Value Date    HGB 7.4 (L) 11/01/2023    HCT 26.0 (L) 11/01/2023     -Patient complaints of headache, lethargy, fatigue for a few weeks  -No source of acute bleeding   -EGD and Colonoscopy on 9/5/23: Gastric mucosa with chronic inactive gastritis, negative for intestinal metaplasia, dysplasia, and malignancy; Helicobacter pylori negative   -Hgb decreased by 1 point in the past 3 months   -Type and Screen and Blood consent obtained  -She is to receive 1 unit of PRBC   -V/S q 4 hrs  -Monitor serial CBC and transfuse if patient becomes hemodynamically unstable, symptomatic or H/H drops below 7/21  -GI consulted and would appreciate recommendations    Iron deficiency anemia  -Per chart review patient was to start on iron infusion and B12 supplementation but patient states she has not started that  -Studies pending: B12, Folate, Iron, Ferritin  -V/S q 4hours  -Monitor serial CBC and transfuse if patient becomes hemodynamically unstable, symptomatic or H/H drops below 7/21    Severe obesity (BMI >= 40)  Body mass index is 40.1 kg/m². Morbid obesity complicates all aspects of disease management from diagnostic modalities to treatment. Weight loss encouraged and health benefits explained to patient.    VTE Risk Mitigation (From admission, onward)         Ordered     IP VTE HIGH RISK PATIENT  Once         11/01/23 2148     Place sequential compression device  Until discontinued         11/01/23 2148                 VTE: SCD  Code: Full  Diet: Regular  Dispo: pending transfusion and GI evaluation     As clarification, on 11/2/2023, patient should be placed in hospital observation services under my care in collaboration with Ion Benson MD Rashed Mahmud, PA-C  Department of  [facility] Medicine  [facility] - Emergency Dept    N/A  Family history is reviewed and has not changed   Pertinent information:

## 2023-11-02 NOTE — ASSESSMENT & PLAN NOTE
-Per chart review patient was to start on iron infusion and B12 supplementation but patient states she has not started that  -Studies pending: B12, Folate, Iron, Ferritin  -V/S q 4hours  -Monitor serial CBC and transfuse if patient becomes hemodynamically unstable, symptomatic or H/H drops below 7/21

## 2023-11-02 NOTE — ASSESSMENT & PLAN NOTE
Patient's anemia is currently uncontrolled. Etiology likely d/t acute blood loss which was from menstrual cycle, chronic blood loss and Iron deficiency  Current CBC reviewed-   Lab Results   Component Value Date    HGB 7.4 (L) 11/01/2023    HCT 26.0 (L) 11/01/2023     -Patient complaints of headache, lethargy, fatigue for a few weeks  -No source of acute bleeding   -EGD and Colonoscopy on 9/5/23: Gastric mucosa with chronic inactive gastritis, negative for intestinal metaplasia, dysplasia, and malignancy; Helicobacter pylori negative   -Hgb decreased by 1 point in the past 3 months   -Type and Screen and Blood consent obtained  -She is to receive 1 unit of PRBC   -V/S q 4 hrs  -Monitor serial CBC and transfuse if patient becomes hemodynamically unstable, symptomatic or H/H drops below 7/21  -GI consulted and would appreciate recommendations

## 2023-11-02 NOTE — PHARMACY MED REC
"Admission Medication History     The home medication history was taken by Mimi Escobar.    You may go to "Admission" then "Reconcile Home Medications" tabs to review and/or act upon these items.     The home medication list has been updated by the Pharmacy department.   Please read ALL comments highlighted in yellow.   Please address this information as you see fit.    Feel free to contact us if you have any questions or require assistance.      Medications listed below were obtained from: Patient/family and Analytic software- Exam18  (Not in a hospital admission)          Mimi Escobar  [phone]                 .          "

## 2023-11-02 NOTE — PLAN OF CARE
Case Management Assessment     PCP: Federico Pina MD    Pharmacy:   [id number] - [place] - [address]  [address]  [place] [address]  Phone: [phone] Fax: [phone]        Patient Arrived From: Home  Existing Help at Home:     Barriers to Discharge: No barriers to discharge.     Discharge Plan:    A. Home   B. Home     11/02/23 1016   Discharge Planning   Assessment Type Discharge Planning Brief Assessment   Resource/Environmental Concerns none   Support Systems Spouse/significant other   Equipment Currently Used at Home none   Current Living Arrangements home   Patient/Family Anticipates Transition to home   Patient/Family Anticipated Services at Transition none   DME Needed Upon Discharge  none   Discharge Plan A Home   Discharge Plan B Home

## 2023-11-02 NOTE — HOSPITAL COURSE
Rob Hernández was placed under observation for management of symptomatic anemia.    Throughout her observation stay, Ms. Hernández received 1 unit of PRBCs, without any issues or complaints.  Repeat hemoglobin on morning labs was noted to show an appropriate elevation to 8.4.  She states that she was previously on iron supplementation, which she stopped, due to issues with constipation.  I educated her on the importance of adhering to prescribed medication regimen to ensure no further episodes of symptomatic anemia.  She endorses that she has been experiencing heavier menstrual cycles, and I recommended to her that she follows up with her OBGYN as well as with her PCP and a gastroenterologist.  Ms. Hernández is medically and hemodynamically stable for discharge.  Vital signs prior to discharge are as follows:  Afebrile at 98.9° F, HR:  68 bpm, RR: 18, BP: 117/74 with oxygen saturation of 99% on RA.    All findings and plan were explained to the patient. All questions and concerns were answered. Patient verbalized understanding. Patient is in stable condition to d/c home and has been informed to follow up with her PCP within the next 7-10 days to discuss her observation stay and to follow-up with Gastroenterology and OB-GYN. Patient has been educated to return to the ED if she experiences any further symptoms of anemia, nidia/overt bleeding, chest pain, shortness of breath, lightheadness, weakness, or discomfort.

## 2023-11-02 NOTE — HPI
Rob Hernández is a 43 y.o. with a pmh of chronic anemia  presents to the hospital with complaints of fatigue, lethargy, and headaches for the past 2 weeks. Patient states that she tried to sleep it off as the pain at times became too much. Patient also experienced intermittent sob and lightheadedness. Patient called her PCP who suggested to come to the ED to get blood work done. Patient is being followed by Dr. Garrett of GI at [facility], where she states that she had her colonoscopy and EGD on 9/5/23. She stated that the results were unremarkable and the source of bleeding was not found. Patient denies fever, chest pain, abdominal pain, melena, hematemesis, hematochezia, hematuria, n/v/d, or any other associated symptoms. Patient states that she was supposed to start on iron infusion but that has not happened yet. She has regular menstrual cycles but states that they have been a little heavier than normal. She does not report heavy NSAID usage and not on any blood thinners. Patient has no known allergies.     In the ED: Patient is afebrile without leukocytosis, Hgb at 7.4 today (8.5 about 3 months ago), MCV 68, Covid and flu negative, CT head shows no acute intracranial abnormality and no hemorrhage, CXR shows no acute process. Patient was typed and screened and prepared for 1 unit of blood transfusion. Given Toradol for headaches with relief.     Case discussed with ED.     Rob Hernández will be placed under observation for further medical management of her anemia with 1 unit of PRBC.

## 2023-11-10 ENCOUNTER — OFFICE VISIT (OUTPATIENT)
Dept: OBSTETRICS AND GYNECOLOGY | Facility: CLINIC | Age: 43
End: 2023-11-10
Payer: MEDICAID

## 2023-11-10 VITALS
BODY MASS INDEX: 39.85 KG/M2 | SYSTOLIC BLOOD PRESSURE: 118 MMHG | HEIGHT: 67 IN | WEIGHT: 253.88 LBS | DIASTOLIC BLOOD PRESSURE: 82 MMHG

## 2023-11-10 DIAGNOSIS — N92.0 MENORRHAGIA WITH REGULAR CYCLE: Primary | ICD-10-CM

## 2023-11-10 DIAGNOSIS — D64.9 SYMPTOMATIC ANEMIA: ICD-10-CM

## 2023-11-10 PROCEDURE — 3079F DIAST BP 80-89 MM HG: CPT | Mod: CPTII,,,

## 2023-11-10 PROCEDURE — 3079F PR MOST RECENT DIASTOLIC BLOOD PRESSURE 80-89 MM HG: ICD-10-PCS | Mod: CPTII,,,

## 2023-11-10 PROCEDURE — 1159F PR MEDICATION LIST DOCUMENTED IN MEDICAL RECORD: ICD-10-PCS | Mod: CPTII,,,

## 2023-11-10 PROCEDURE — 1159F MED LIST DOCD IN RCRD: CPT | Mod: CPTII,,,

## 2023-11-10 PROCEDURE — 3008F BODY MASS INDEX DOCD: CPT | Mod: CPTII,,,

## 2023-11-10 PROCEDURE — 3074F SYST BP LT 130 MM HG: CPT | Mod: CPTII,,,

## 2023-11-10 PROCEDURE — 3008F PR BODY MASS INDEX (BMI) DOCUMENTED: ICD-10-PCS | Mod: CPTII,,,

## 2023-11-10 PROCEDURE — 99203 PR OFFICE/OUTPT VISIT, NEW, LEVL III, 30-44 MIN: ICD-10-PCS | Mod: S$PBB,,,

## 2023-11-10 PROCEDURE — 3074F PR MOST RECENT SYSTOLIC BLOOD PRESSURE < 130 MM HG: ICD-10-PCS | Mod: CPTII,,,

## 2023-11-10 PROCEDURE — 99999 PR PBB SHADOW E&M-EST. PATIENT-LVL III: CPT | Mod: PBBFAC,,,

## 2023-11-10 PROCEDURE — 99213 OFFICE O/P EST LOW 20 MIN: CPT | Mod: PBBFAC

## 2023-11-10 PROCEDURE — 99999 PR PBB SHADOW E&M-EST. PATIENT-LVL III: ICD-10-PCS | Mod: PBBFAC,,,

## 2023-11-10 PROCEDURE — 99203 OFFICE O/P NEW LOW 30 MIN: CPT | Mod: S$PBB,,,

## 2023-11-10 NOTE — PROGRESS NOTES
"  Subjective:      Patient ID: Rob Hernández is a 43 y.o. female.    Chief Complaint:  Well Woman and Anemia      History of Present Illness  Anemia  There has been no abdominal pain. Signs of blood loss that are present include menorrhagia. Signs of blood loss that are not present include vaginal bleeding.     Ms. Rob Hernández  is a 43 year old  that presents today due to menorrhagia, causing anemia. She was seen in the ED on  for headache and intermittent SOB. H&H was 7.4 & 26.0. Pt was transfused 1 unit PRBC.  She reports that her cycles had always been regular and light. Reports changes about 2 years ago where they began to be a lot heavier. States she can go through a "bag of pads" in once cycle.  She is currently taking iron supplements.   Feeling better today.    GYN & OB History  Patient's last menstrual period was 11/10/2023 (exact date).   Date of Last Pap: 2018 (due today)- annual rescheduled per pt request due to cycle starting today.    OB History    Para Term  AB Living   4 4 4     4   SAB IAB Ectopic Multiple Live Births           4      # Outcome Date GA Lbr Carlitos/2nd Weight Sex Delivery Anes PTL Lv   4 Term 10/12/11 40w0d   F Vag-Spont  N AURY   3 Term 06 40w0d   M Vag-Spont  N AURY   2 Term 04 40w0d    Vag-Spont  N AURY   1 Term 00 40w0d   M Vag-Spont  N AURY       Review of Systems  Review of Systems   Constitutional:  Negative for activity change and appetite change.   Respiratory:  Negative for shortness of breath.    Cardiovascular:  Negative for chest pain.   Gastrointestinal:  Negative for abdominal pain, diarrhea and nausea.   Genitourinary:  Positive for menorrhagia. Negative for bladder incontinence, decreased libido, dysmenorrhea, dyspareunia, dysuria, flank pain, frequency, genital sores, hematuria, hot flashes, menstrual problem, pelvic pain, urgency, vaginal bleeding, vaginal discharge, vaginal pain, urinary incontinence, " postcoital bleeding, postmenopausal bleeding, vaginal dryness and vaginal odor.   Integumentary:  Negative for breast tenderness.   Neurological:  Negative for headaches.   Breast: Negative for breast self exam and tenderness         Objective:     Physical Exam:   Constitutional: She is oriented to person, place, and time. She appears well-developed.    HENT:   Head: Normocephalic and atraumatic.    Eyes: EOM are normal.     Cardiovascular:  Normal rate.             Pulmonary/Chest: Effort normal.        Abdominal: Soft. There is no abdominal tenderness.             Musculoskeletal: Normal range of motion.       Neurological: She is oriented to person, place, and time.    Skin: Skin is warm.    Psychiatric: She has a normal mood and affect.       Refused pelvic today due to currently on cycle  Assessment:     1. Menorrhagia with regular cycle    2. Symptomatic anemia              Plan:     1. Symptomatic anemia  - Ambulatory referral/consult to Obstetrics / Gynecology    2. Menorrhagia with regular cycle  - TSH; Future  - T4, free; Future  - Prolactin; Future  - Luteinizing Hormone; Future  - Follicle Stimulating Hormone; Future  - US Pelvis Comp with Transvag NON-OB (xpd; Future     FU in 2 weeks for annual  ER precautions  Shawna Vicente FNP-BC

## 2023-11-11 ENCOUNTER — LAB VISIT (OUTPATIENT)
Dept: LAB | Facility: HOSPITAL | Age: 43
End: 2023-11-11
Payer: COMMERCIAL

## 2023-11-11 DIAGNOSIS — N92.0 MENORRHAGIA WITH REGULAR CYCLE: ICD-10-CM

## 2023-11-11 LAB
T4 FREE SERPL-MCNC: 0.86 NG/DL (ref 0.71–1.51)
TSH SERPL DL<=0.005 MIU/L-ACNC: 0.88 UIU/ML (ref 0.4–4)

## 2023-11-11 PROCEDURE — 83002 ASSAY OF GONADOTROPIN (LH): CPT

## 2023-11-11 PROCEDURE — 84443 ASSAY THYROID STIM HORMONE: CPT

## 2023-11-11 PROCEDURE — 84146 ASSAY OF PROLACTIN: CPT

## 2023-11-11 PROCEDURE — 83001 ASSAY OF GONADOTROPIN (FSH): CPT

## 2023-11-11 PROCEDURE — 84439 ASSAY OF FREE THYROXINE: CPT

## 2023-11-11 PROCEDURE — 36415 COLL VENOUS BLD VENIPUNCTURE: CPT

## 2023-11-12 LAB
FSH SERPL-ACNC: 11.64 MIU/ML
LH SERPL-ACNC: 2.9 MIU/ML
PROLACTIN SERPL IA-MCNC: 17.8 NG/ML (ref 5.2–26.5)

## 2023-11-22 ENCOUNTER — PATIENT MESSAGE (OUTPATIENT)
Dept: OBSTETRICS AND GYNECOLOGY | Facility: CLINIC | Age: 43
End: 2023-11-22
Payer: COMMERCIAL

## 2023-11-22 ENCOUNTER — HOSPITAL ENCOUNTER (OUTPATIENT)
Dept: RADIOLOGY | Facility: HOSPITAL | Age: 43
Discharge: HOME | End: 2023-11-22
Payer: MEDICAID

## 2023-11-22 DIAGNOSIS — N92.0 MENORRHAGIA WITH REGULAR CYCLE: ICD-10-CM

## 2023-11-22 PROCEDURE — 76830 TRANSVAGINAL US NON-OB: CPT | Mod: 26,,, | Performed by: RADIOLOGY

## 2023-11-22 PROCEDURE — 76830 US PELVIS COMP WITH TRANSVAG NON-OB (XPD): ICD-10-PCS | Mod: 26,,, | Performed by: RADIOLOGY

## 2023-11-22 PROCEDURE — 76856 US EXAM PELVIC COMPLETE: CPT | Mod: 26,,, | Performed by: RADIOLOGY

## 2023-11-22 PROCEDURE — 76856 US PELVIS COMP WITH TRANSVAG NON-OB (XPD): ICD-10-PCS | Mod: 26,,, | Performed by: RADIOLOGY

## 2023-11-22 PROCEDURE — 76830 TRANSVAGINAL US NON-OB: CPT | Mod: TC

## 2023-11-24 ENCOUNTER — TELEPHONE (OUTPATIENT)
Dept: OBSTETRICS AND GYNECOLOGY | Facility: CLINIC | Age: 43
End: 2023-11-24
Payer: COMMERCIAL

## 2023-11-24 NOTE — TELEPHONE ENCOUNTER
Called the pt to confirm appt for [facility] on 12/1 and to confirm that appt will be fine to discuss U/S Results. No answer. Unable to LVM because VM is full.

## 2023-11-24 NOTE — TELEPHONE ENCOUNTER
----- Message from Shawna Vicente NP sent at 11/22/2023  7:57 PM CST -----  Please schedule a virtual visit to discuss her ultrasound results.

## 2023-12-01 ENCOUNTER — OFFICE VISIT (OUTPATIENT)
Dept: OBSTETRICS AND GYNECOLOGY | Facility: CLINIC | Age: 43
End: 2023-12-01
Payer: COMMERCIAL

## 2023-12-01 VITALS
BODY MASS INDEX: 40.52 KG/M2 | SYSTOLIC BLOOD PRESSURE: 112 MMHG | WEIGHT: 258.19 LBS | DIASTOLIC BLOOD PRESSURE: 76 MMHG | HEIGHT: 67 IN

## 2023-12-01 DIAGNOSIS — Z11.3 SCREEN FOR STD (SEXUALLY TRANSMITTED DISEASE): ICD-10-CM

## 2023-12-01 DIAGNOSIS — Z12.39 SCREENING BREAST EXAMINATION: ICD-10-CM

## 2023-12-01 DIAGNOSIS — Z01.419 ENCOUNTER FOR GYNECOLOGICAL EXAMINATION WITHOUT ABNORMAL FINDING: Primary | ICD-10-CM

## 2023-12-01 PROCEDURE — 99999 PR PBB SHADOW E&M-EST. PATIENT-LVL II: ICD-10-PCS | Mod: PBBFAC,,,

## 2023-12-01 PROCEDURE — 3074F SYST BP LT 130 MM HG: CPT | Mod: CPTII,S$GLB,,

## 2023-12-01 PROCEDURE — 1159F PR MEDICATION LIST DOCUMENTED IN MEDICAL RECORD: ICD-10-PCS | Mod: CPTII,S$GLB,,

## 2023-12-01 PROCEDURE — 87624 HPV HI-RISK TYP POOLED RSLT: CPT

## 2023-12-01 PROCEDURE — 3074F PR MOST RECENT SYSTOLIC BLOOD PRESSURE < 130 MM HG: ICD-10-PCS | Mod: CPTII,S$GLB,,

## 2023-12-01 PROCEDURE — 3078F DIAST BP <80 MM HG: CPT | Mod: CPTII,S$GLB,,

## 2023-12-01 PROCEDURE — 3078F PR MOST RECENT DIASTOLIC BLOOD PRESSURE < 80 MM HG: ICD-10-PCS | Mod: CPTII,S$GLB,,

## 2023-12-01 PROCEDURE — 3008F BODY MASS INDEX DOCD: CPT | Mod: CPTII,S$GLB,,

## 2023-12-01 PROCEDURE — 1159F MED LIST DOCD IN RCRD: CPT | Mod: CPTII,S$GLB,,

## 2023-12-01 PROCEDURE — 99212 OFFICE O/P EST SF 10 MIN: CPT | Mod: PBBFAC

## 2023-12-01 PROCEDURE — 99999 PR PBB SHADOW E&M-EST. PATIENT-LVL II: CPT | Mod: PBBFAC,,,

## 2023-12-01 PROCEDURE — 88175 CYTOPATH C/V AUTO FLUID REDO: CPT

## 2023-12-01 PROCEDURE — 99396 PREV VISIT EST AGE 40-64: CPT | Mod: S$GLB,,,

## 2023-12-01 PROCEDURE — 81514 NFCT DS BV&VAGINITIS DNA ALG: CPT

## 2023-12-01 PROCEDURE — 3008F PR BODY MASS INDEX (BMI) DOCUMENTED: ICD-10-PCS | Mod: CPTII,S$GLB,,

## 2023-12-01 PROCEDURE — 99396 PR PREVENTIVE VISIT,EST,40-64: ICD-10-PCS | Mod: S$GLB,,,

## 2023-12-01 PROCEDURE — 87491 CHLMYD TRACH DNA AMP PROBE: CPT

## 2023-12-01 RX ORDER — ERGOCALCIFEROL 1.25 MG/1
50000 CAPSULE ORAL
COMMUNITY
Start: 2023-11-10

## 2023-12-01 RX ORDER — LANOLIN ALCOHOL/MO/W.PET/CERES
1000 CREAM (GRAM) TOPICAL
COMMUNITY
Start: 2023-11-10

## 2023-12-01 RX ORDER — TRIAMCINOLONE ACETONIDE 1 MG/G
CREAM TOPICAL
COMMUNITY
Start: 2023-11-10

## 2023-12-01 NOTE — PROGRESS NOTES
Subjective:      Patient ID: Rob Hernández is a 43 y.o. female.    Chief Complaint:  Well Woman      History of Present Illness  HPI  HISTORY OF PRESENT ILLNESS:    Rob Hernández is a 43 y.o. female, , Patient's last menstrual period was 11/10/2023 (exact date).,  presents for a routine exam and has no complaints.   She is not currently sexually active. She uses tubal for contraception.  History of STDs: denies.  She does want STD screening.  Denies any other GYN complaints.      The patient participates in regular exercise: yes.  The patient does not smoke.  The patient wears seatbelts.   Pt denies any domestic violence. reports tattoos or blood transfusions    SCREENING:   Pap: 2018   Mammogram: 2023   TC: 8.45% %  Colonoscopy: N/A   DEXA:  N/A     GYN FH:   Breast cancer: PAT AUNT  Colon cancer: MAT GM  Ovarian cancer: none  Endometrial cancer: none      WAS RECENTLY SEEN DUE TO DYSMENORRHEA AND MENORRHAGIA. SP BLOOD TRANSFUSION ON ON 2023  US SHOWED:    Results for orders placed during the hospital encounter of 23    US Pelvis Comp with Transvag NON-OB (xpd    Narrative  EXAMINATION:  US PELVIS COMP WITH TRANSVAG NON-OB (XPD)    CLINICAL HISTORY:  Excessive and frequent menstruation with regular cycle    TECHNIQUE:  Transabdominal and transvaginal pelvic ultrasound.    COMPARISON:  None    FINDINGS:  Uterus: Measures 12.9 x 8.2 x 10.0 cm.  Endometrial echo complex measures 28 mm, thickened.  There is a dominant intramural myoma in the left uterine body measuring 8.3 x 6.7 x 7.1 cm.    Right ovary: Measures 3.6 x 2.4 x 2.7 cm.  Note made of 2.3 cm follicle.  Blood flow is present.    Left ovary: Not visualized.    Miscellaneous: No free fluid.    Impression  1. Large uterine leiomyoma.  2. Thickened endometrial echo complex.  3. Left ovary not visualized; right ovary unremarkable.      Electronically signed by: Bob Escalera  MD  Date:    2023  Time:    09:29     No results found for this or any previous visit.    SHE WOULD LIKE TO DISCUSS REMOVAL.  GYN & OB History  Patient's last menstrual period was 11/10/2023 (exact date).   Date of Last Pap: 2023    OB History    Para Term  AB Living   4 4 4     4   SAB IAB Ectopic Multiple Live Births           4      # Outcome Date GA Lbr Carlitos/2nd Weight Sex Delivery Anes PTL Lv   4 Term 10/12/11 40w0d   F Vag-Spont  N AURY   3 Term 06 40w0d   M Vag-Spont  N AURY   2 Term 04 40w0d    Vag-Spont  N AURY   1 Term 00 40w0d   M Vag-Spont  N AURY       Review of Systems  Review of Systems   Constitutional:  Negative for activity change and appetite change.   Respiratory:  Negative for shortness of breath.    Cardiovascular:  Negative for chest pain.   Gastrointestinal:  Negative for abdominal pain, diarrhea and nausea.   Genitourinary:  Positive for menorrhagia and menstrual problem. Negative for bladder incontinence, decreased libido, dysmenorrhea, dyspareunia, dysuria, flank pain, frequency, genital sores, hematuria, hot flashes, pelvic pain, urgency, vaginal bleeding, vaginal discharge, vaginal pain, urinary incontinence, postcoital bleeding, postmenopausal bleeding, vaginal dryness and vaginal odor.   Integumentary:  Negative for breast tenderness.   Neurological:  Negative for headaches.   Breast: Negative for breast self exam and tenderness         Objective:     Physical Exam:   Constitutional: She is oriented to person, place, and time. She appears well-developed and well-nourished.    HENT:   Head: Normocephalic.      Cardiovascular:       Exam reveals no clubbing.        Pulmonary/Chest: Effort normal and breath sounds normal. Right breast exhibits no nipple discharge, no skin change, no tenderness, no bleeding and no swelling. Left breast exhibits no nipple discharge, no skin change, no tenderness, no bleeding and no swelling. Breasts are symmetrical.         Abdominal: Soft. There is no abdominal tenderness. Hernia confirmed negative in the right inguinal area and confirmed negative in the left inguinal area.     Genitourinary:    Inguinal canal, vagina, uterus, right adnexa, left adnexa and rectum normal.   Rectum:      No tenderness.      Pelvic exam was performed with patient supine.   The external female genitalia was normal.   No external genitalia lesions identified,Genitalia hair distrobution normal .   Labial bartholins normal.Cervix is normal. No  no vaginal discharge or tenderness in the vagina.    No signs of injury in the vagina.   Vagina was moist.Cervix exhibits no discharge and no tenderness. Uterus is not tender. Normal urethral meatus.Urethra findings: no tendernessBladder findings: no bladder tenderness          Musculoskeletal: Normal range of motion and moves all extremeties.      Lymphadenopathy: No inguinal adenopathy noted on the right or left side.    Neurological: She is alert and oriented to person, place, and time.    Skin: Skin is warm. Nails show no clubbing.    Psychiatric: She has a normal mood and affect. Her behavior is normal. Judgment and thought content normal.         Assessment:     1. Encounter for gynecological examination without abnormal finding    2. Screening breast examination    3. Screen for STD (sexually transmitted disease)              Plan:   1. Encounter for gynecological examination without abnormal finding  - Liquid-Based Pap Smear, Screening  - HPV High Risk Genotypes, PCR  - Pap and HPV done today.  - Screening tests as ordered.  - Diet and exercise encouraged.  Condom use encouraged for STD prevention.  Seat belt use encouraged.  Reviewed ASCCP Pap guidelines and screening recommendations.  Calcium and vitamin D recommended.     Counseling: injury prevention: Driving under the influence of alcohol  Weapons  Seatbelts  Bicycle helmets  Exercise  Health Screens: Health Maintenance up to date  Stress management  techniques  indications for and frequency of periodic gynecologic exam  reviewed current Pap guidelines. Explained new understanding of natural history of cervical disease and improved Paps. Recommended guideline concordant care.    The patient was counseled today on ACS PAP guidelines, with recommendations for yearly pelvic exams unless their uterus, cervix, and ovaries were removed for benign reasons; in that case, examinations every 3-5 years are recommended.  The patient was also counseled regarding monthly breast self-examination, routine STD screening for at-risk populations, prophylactic immunizations for transmitted infections such as  HPV, Pertussis, or Influenza as appropriate, and yearly mammograms when indicated by ACS guidelines.  She was advised to see her primary care physician for all other health maintenance.       2. Screening breast examination  - Self breast exams encouraged  - mm UTD    3. Screen for STD (sexually transmitted disease)  - C. trachomatis/N. gonorrhoeae by AMP DNA  - Vaginosis Screen by DNA Probe  - Hepatitis B Surface Antigen; Future  - Hepatitis C Antibody; Future  - HIV 1/2 Ag/Ab (4th Gen); Future  - RPR; Future     FOLLOW UP APPT SCHEDULED TO DISCUSS FIBROID REMOVAL OPTIONS  FOLLOW UP WITH ME ANNUALLY OR SOONER IF NEEDED    IVIS Santos-BC

## 2023-12-05 LAB
BACTERIAL VAGINOSIS DNA: POSITIVE
C TRACH DNA SPEC QL NAA+PROBE: NOT DETECTED
CANDIDA GLABRATA DNA: NEGATIVE
CANDIDA KRUSEI DNA: NEGATIVE
CANDIDA RRNA VAG QL PROBE: NEGATIVE
N GONORRHOEA DNA SPEC QL NAA+PROBE: NOT DETECTED
T VAGINALIS RRNA GENITAL QL PROBE: NEGATIVE

## 2023-12-06 ENCOUNTER — PATIENT MESSAGE (OUTPATIENT)
Dept: OBSTETRICS AND GYNECOLOGY | Facility: CLINIC | Age: 43
End: 2023-12-06
Payer: COMMERCIAL

## 2023-12-06 DIAGNOSIS — N76.0 BV (BACTERIAL VAGINOSIS): Primary | ICD-10-CM

## 2023-12-06 DIAGNOSIS — B96.89 BV (BACTERIAL VAGINOSIS): Primary | ICD-10-CM

## 2023-12-06 LAB
HPV HR 12 DNA SPEC QL NAA+PROBE: NEGATIVE
HPV16 AG SPEC QL: NEGATIVE
HPV18 DNA SPEC QL NAA+PROBE: NEGATIVE

## 2023-12-06 RX ORDER — METRONIDAZOLE 500 MG/1
500 TABLET ORAL 2 TIMES DAILY
Qty: 14 TABLET | Refills: 0 | Status: SHIPPED | OUTPATIENT
Start: 2023-12-06 | End: 2023-12-13

## 2023-12-11 LAB
FINAL PATHOLOGIC DIAGNOSIS: NORMAL
Lab: NORMAL

## 2023-12-26 ENCOUNTER — LAB VISIT (OUTPATIENT)
Dept: LAB | Facility: HOSPITAL | Age: 43
End: 2023-12-26
Payer: COMMERCIAL

## 2023-12-26 DIAGNOSIS — D64.9 SYMPTOMATIC ANEMIA: ICD-10-CM

## 2023-12-26 DIAGNOSIS — Z11.3 SCREEN FOR STD (SEXUALLY TRANSMITTED DISEASE): ICD-10-CM

## 2023-12-26 LAB
BASOPHILS # BLD AUTO: 0.04 K/UL (ref 0–0.2)
BASOPHILS NFR BLD: 0.6 % (ref 0–1.9)
DIFFERENTIAL METHOD: ABNORMAL
EOSINOPHIL # BLD AUTO: 0.2 K/UL (ref 0–0.5)
EOSINOPHIL NFR BLD: 3.3 % (ref 0–8)
ERYTHROCYTE [DISTWIDTH] IN BLOOD BY AUTOMATED COUNT: 23.2 % (ref 11.5–14.5)
HBV SURFACE AG SERPL QL IA: NORMAL
HCT VFR BLD AUTO: 34.5 % (ref 37–48.5)
HCV AB SERPL QL IA: NORMAL
HGB BLD-MCNC: 9.9 G/DL (ref 12–16)
HIV 1+2 AB+HIV1 P24 AG SERPL QL IA: NORMAL
IMM GRANULOCYTES # BLD AUTO: 0.02 K/UL (ref 0–0.04)
IMM GRANULOCYTES NFR BLD AUTO: 0.3 % (ref 0–0.5)
LYMPHOCYTES # BLD AUTO: 1.8 K/UL (ref 1–4.8)
LYMPHOCYTES NFR BLD: 26 % (ref 18–48)
MCH RBC QN AUTO: 22.9 PG (ref 27–31)
MCHC RBC AUTO-ENTMCNC: 28.7 G/DL (ref 32–36)
MCV RBC AUTO: 80 FL (ref 82–98)
MONOCYTES # BLD AUTO: 0.5 K/UL (ref 0.3–1)
MONOCYTES NFR BLD: 6.5 % (ref 4–15)
NEUTROPHILS # BLD AUTO: 4.4 K/UL (ref 1.8–7.7)
NEUTROPHILS NFR BLD: 63.3 % (ref 38–73)
NRBC BLD-RTO: 0 /100 WBC
PLATELET # BLD AUTO: 271 K/UL (ref 150–450)
PMV BLD AUTO: 8.3 FL (ref 9.2–12.9)
RBC # BLD AUTO: 4.32 M/UL (ref 4–5.4)
WBC # BLD AUTO: 6.93 K/UL (ref 3.9–12.7)

## 2023-12-26 PROCEDURE — 87340 HEPATITIS B SURFACE AG IA: CPT

## 2023-12-26 PROCEDURE — 85025 COMPLETE CBC W/AUTO DIFF WBC: CPT

## 2023-12-26 PROCEDURE — 87389 HIV-1 AG W/HIV-1&-2 AB AG IA: CPT

## 2023-12-26 PROCEDURE — 36415 COLL VENOUS BLD VENIPUNCTURE: CPT

## 2023-12-26 PROCEDURE — 86592 SYPHILIS TEST NON-TREP QUAL: CPT

## 2023-12-26 PROCEDURE — 86803 HEPATITIS C AB TEST: CPT

## 2023-12-27 LAB — RPR SER QL: NORMAL

## 2024-02-05 DIAGNOSIS — D25.1 INTRAMURAL LEIOMYOMA OF UTERUS: Primary | ICD-10-CM

## 2024-02-15 ENCOUNTER — HOSPITAL ENCOUNTER (OUTPATIENT)
Dept: RADIOLOGY | Facility: HOSPITAL | Age: 44
Discharge: HOME | End: 2024-02-15
Attending: INTERNAL MEDICINE
Payer: MEDICAID

## 2024-02-15 DIAGNOSIS — D25.1 INTRAMURAL LEIOMYOMA OF UTERUS: ICD-10-CM

## 2024-02-15 PROCEDURE — 76830 TRANSVAGINAL US NON-OB: CPT | Mod: TC

## 2024-02-15 PROCEDURE — 76830 TRANSVAGINAL US NON-OB: CPT | Mod: 26,,, | Performed by: RADIOLOGY

## 2024-02-15 PROCEDURE — 76856 US EXAM PELVIC COMPLETE: CPT | Mod: 26,,, | Performed by: RADIOLOGY

## 2024-04-03 ENCOUNTER — HOSPITAL ENCOUNTER (OUTPATIENT)
Dept: RADIOLOGY | Facility: HOSPITAL | Age: 44
Discharge: HOME | End: 2024-04-03
Payer: COMMERCIAL

## 2024-04-03 DIAGNOSIS — M72.2 PLANTAR FASCIAL FIBROMATOSIS: Primary | ICD-10-CM

## 2024-04-03 DIAGNOSIS — M72.2 PLANTAR FASCIAL FIBROMATOSIS: ICD-10-CM

## 2024-04-03 PROCEDURE — 73630 X-RAY EXAM OF FOOT: CPT | Mod: TC,FY,RT

## 2024-04-03 PROCEDURE — 73630 X-RAY EXAM OF FOOT: CPT | Mod: 26,RT,, | Performed by: RADIOLOGY

## 2024-07-08 DIAGNOSIS — Z12.31 ENCOUNTER FOR SCREENING MAMMOGRAM FOR MALIGNANT NEOPLASM OF BREAST: Primary | ICD-10-CM

## 2024-09-01 ENCOUNTER — HOSPITAL ENCOUNTER (EMERGENCY)
Facility: HOSPITAL | Age: 44
Discharge: HOME | End: 2024-09-02
Attending: EMERGENCY MEDICINE
Payer: COMMERCIAL

## 2024-09-01 DIAGNOSIS — H53.149 PHOTOPHOBIA: ICD-10-CM

## 2024-09-01 DIAGNOSIS — R53.83 FATIGUE: Primary | ICD-10-CM

## 2024-09-01 DIAGNOSIS — F40.298 PHONOPHOBIA: ICD-10-CM

## 2024-09-01 DIAGNOSIS — R51.9 ACUTE NONINTRACTABLE HEADACHE, UNSPECIFIED HEADACHE TYPE: ICD-10-CM

## 2024-09-01 LAB
ABO + RH BLD: NORMAL
ALBUMIN SERPL BCP-MCNC: 3.6 G/DL (ref 3.5–5.2)
ALP SERPL-CCNC: 65 U/L (ref 55–135)
ALT SERPL W/O P-5'-P-CCNC: 17 U/L (ref 10–44)
ANION GAP SERPL CALC-SCNC: 10 MMOL/L (ref 8–16)
APTT PPP: 28.4 SEC (ref 21–32)
AST SERPL-CCNC: 20 U/L (ref 10–40)
B-HCG UR QL: NEGATIVE
BASOPHILS # BLD AUTO: 0.05 K/UL (ref 0–0.2)
BASOPHILS NFR BLD: 0.6 % (ref 0–1.9)
BILIRUB SERPL-MCNC: 0.2 MG/DL (ref 0.1–1)
BLD GP AB SCN CELLS X3 SERPL QL: NORMAL
BUN SERPL-MCNC: 13 MG/DL (ref 6–20)
CALCIUM SERPL-MCNC: 9 MG/DL (ref 8.7–10.5)
CHLORIDE SERPL-SCNC: 109 MMOL/L (ref 95–110)
CO2 SERPL-SCNC: 23 MMOL/L (ref 23–29)
CREAT SERPL-MCNC: 0.8 MG/DL (ref 0.5–1.4)
CTP QC/QA: YES
DIFFERENTIAL METHOD BLD: ABNORMAL
EOSINOPHIL # BLD AUTO: 0.2 K/UL (ref 0–0.5)
EOSINOPHIL NFR BLD: 2.9 % (ref 0–8)
ERYTHROCYTE [DISTWIDTH] IN BLOOD BY AUTOMATED COUNT: 20.9 % (ref 11.5–14.5)
EST. GFR  (NO RACE VARIABLE): >60 ML/MIN/1.73 M^2
GLUCOSE SERPL-MCNC: 81 MG/DL (ref 70–110)
HCT VFR BLD AUTO: 34.1 % (ref 37–48.5)
HGB BLD-MCNC: 10.2 G/DL (ref 12–16)
IMM GRANULOCYTES # BLD AUTO: 0.01 K/UL (ref 0–0.04)
IMM GRANULOCYTES NFR BLD AUTO: 0.1 % (ref 0–0.5)
INR PPP: 1 (ref 0.8–1.2)
LYMPHOCYTES # BLD AUTO: 2.2 K/UL (ref 1–4.8)
LYMPHOCYTES NFR BLD: 27.1 % (ref 18–48)
MAGNESIUM SERPL-MCNC: 1.8 MG/DL (ref 1.6–2.6)
MCH RBC QN AUTO: 24.8 PG (ref 27–31)
MCHC RBC AUTO-ENTMCNC: 29.9 G/DL (ref 32–36)
MCV RBC AUTO: 83 FL (ref 82–98)
MONOCYTES # BLD AUTO: 0.7 K/UL (ref 0.3–1)
MONOCYTES NFR BLD: 9 % (ref 4–15)
NEUTROPHILS # BLD AUTO: 4.8 K/UL (ref 1.8–7.7)
NEUTROPHILS NFR BLD: 60.3 % (ref 38–73)
NRBC BLD-RTO: 0 /100 WBC
PLATELET # BLD AUTO: 327 K/UL (ref 150–450)
PMV BLD AUTO: 9.5 FL (ref 9.2–12.9)
POCT GLUCOSE: 97 MG/DL (ref 70–110)
POTASSIUM SERPL-SCNC: 3.7 MMOL/L (ref 3.5–5.1)
PROT SERPL-MCNC: 7.1 G/DL (ref 6–8.4)
PROTHROMBIN TIME: 10.9 SEC (ref 9–12.5)
RBC # BLD AUTO: 4.12 M/UL (ref 4–5.4)
SODIUM SERPL-SCNC: 142 MMOL/L (ref 136–145)
SPECIMEN OUTDATE: NORMAL
TSH SERPL DL<=0.005 MIU/L-ACNC: 1.08 UIU/ML (ref 0.4–4)
WBC # BLD AUTO: 8.01 K/UL (ref 3.9–12.7)

## 2024-09-01 PROCEDURE — 93010 ELECTROCARDIOGRAM REPORT: CPT | Mod: ,,, | Performed by: INTERNAL MEDICINE

## 2024-09-01 PROCEDURE — 80053 COMPREHEN METABOLIC PANEL: CPT | Performed by: PHYSICIAN ASSISTANT

## 2024-09-01 PROCEDURE — 25000003 PHARM REV CODE 250: Performed by: EMERGENCY MEDICINE

## 2024-09-01 PROCEDURE — 81025 URINE PREGNANCY TEST: CPT

## 2024-09-01 PROCEDURE — 85730 THROMBOPLASTIN TIME PARTIAL: CPT | Performed by: PHYSICIAN ASSISTANT

## 2024-09-01 PROCEDURE — 86850 RBC ANTIBODY SCREEN: CPT | Performed by: PHYSICIAN ASSISTANT

## 2024-09-01 PROCEDURE — 84443 ASSAY THYROID STIM HORMONE: CPT | Performed by: PHYSICIAN ASSISTANT

## 2024-09-01 PROCEDURE — 85610 PROTHROMBIN TIME: CPT | Performed by: PHYSICIAN ASSISTANT

## 2024-09-01 PROCEDURE — 93005 ELECTROCARDIOGRAM TRACING: CPT

## 2024-09-01 PROCEDURE — 83735 ASSAY OF MAGNESIUM: CPT | Performed by: PHYSICIAN ASSISTANT

## 2024-09-01 PROCEDURE — 99285 EMERGENCY DEPT VISIT HI MDM: CPT | Mod: 25

## 2024-09-01 PROCEDURE — 85025 COMPLETE CBC W/AUTO DIFF WBC: CPT | Performed by: PHYSICIAN ASSISTANT

## 2024-09-01 PROCEDURE — 82962 GLUCOSE BLOOD TEST: CPT

## 2024-09-01 RX ORDER — ACETAMINOPHEN 500 MG
1000 TABLET ORAL
Status: COMPLETED | OUTPATIENT
Start: 2024-09-01 | End: 2024-09-01

## 2024-09-01 RX ADMIN — ACETAMINOPHEN 1000 MG: 500 TABLET ORAL at 10:09

## 2024-09-02 VITALS
OXYGEN SATURATION: 97 % | HEART RATE: 69 BPM | HEIGHT: 67 IN | RESPIRATION RATE: 19 BRPM | BODY MASS INDEX: 41.28 KG/M2 | TEMPERATURE: 99 F | DIASTOLIC BLOOD PRESSURE: 59 MMHG | SYSTOLIC BLOOD PRESSURE: 106 MMHG | WEIGHT: 263 LBS

## 2024-09-02 NOTE — DISCHARGE INSTRUCTIONS
Use acetaminophen and ibuprofen to control your pain.     Over the counter tylenol (acetaminophen) comes in tablets of 325mg and 500mg. You may take 3 of the regular strength (325mg) for a total of 975mg, or 2 of the extra strength (500mg) for a total of 1000mg, every 6 hours. Do not exceed a total of 4000mg a day.    Over the counter motrin (ibuprofen) comes in tablets of 200mg. You may take 3 of these tablets (a total of 600mg) every 6 hours or 4 of these tablets (a total of 800mg) every 8 hours. Do not exceed a total of 2400mg a day.

## 2024-09-02 NOTE — ED PROVIDER NOTES
"Encounter Date: 9/1/2024       History     Chief Complaint   Patient presents with    Headache     Pt c/o headache x 3 weeks accompanied by intermittent blurred vision. Pt states she was " a few pints of blood short a few weeks ago" when she had labs done. Pt denies treating her symptoms with medications. Pt states she has had similar symptoms which later required pt to have a blood transfusion. Pt denies cp, sob, n/v/d     43 yo female presents via personal transportation to [facility] with headache.  Patient reports 3 weeks of constant generalized headache associated with phono and photophobia and intermittent bilateral blurred vision.  She also reports bilateral arm numbness.  No nausea or vomiting.  No chest pain or shortness of breath.  Patient also reports fatigue, feeling cold, and having to chew gum to keep herself awake at work.  No meds prior to arrival.  Patient is concerned because in November 2023, when she felt similar to this, she had a hemoglobin of 7.4 and required a blood transfusion.  She has not since required a transfusion.  Patient reports source of her anemia is menorrhagia secondary to uterine fibroid.  She is following with OBGYN about this.    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        Review of patient's allergies indicates:  No Known Allergies  Past Medical History:   Diagnosis Date    Torn ligament     left shoulder    Torn meniscus      Past Surgical History:   Procedure Laterality Date    INNER EAR SURGERY      INNER EAR SURGERY      left knee surg      right shoulder surg      TUBAL LIGATION      VAGINAL DELIVERY      x3     Family History   Problem Relation Name Age of Onset    Hypertension Mother      Breast cancer Paternal Aunt       Social History     Tobacco Use    Smoking status: Never    Smokeless tobacco: Never   Substance Use Topics    Alcohol use: Yes     Comment: occ    Drug use: Yes     Types: Marijuana     Review of Systems "   Constitutional:  Positive for fatigue. Negative for diaphoresis and fever.   HENT:  Negative for sore throat.    Eyes:  Positive for photophobia and visual disturbance.   Respiratory:  Negative for shortness of breath.    Cardiovascular:  Negative for chest pain.   Gastrointestinal:  Negative for abdominal pain, nausea and vomiting.   Genitourinary:  Positive for vaginal bleeding (chronic menorrhagia). Negative for dysuria.   Musculoskeletal:  Negative for gait problem.   Skin:  Negative for rash.   Neurological:  Positive for numbness (bilateral arm) and headaches. Negative for syncope.       Physical Exam     Initial Vitals [09/01/24 1758]   BP Pulse Resp Temp SpO2   (!) 120/59 80 18 98.7 °F (37.1 °C) 100 %      MAP       --         Physical Exam    Nursing note and vitals reviewed.  Constitutional: She appears well-developed and well-nourished. She is not diaphoretic.   Awake, alert, nontoxic, lying in a darkened room with sunglasses on.   HENT:   Head: Normocephalic and atraumatic.   Mouth/Throat: Oropharynx is clear and moist.   Eyes: Conjunctivae and EOM are normal. Pupils are equal, round, and reactive to light.   Neck: Neck supple.   Normal range of motion.  Cardiovascular:  Normal rate, regular rhythm and intact distal pulses.           No murmur heard.  Pulmonary/Chest: Breath sounds normal. No respiratory distress. She has no wheezes. She has no rhonchi. She has no rales.   Abdominal: Abdomen is soft. There is no abdominal tenderness.   Musculoskeletal:         General: No tenderness or edema. Normal range of motion.      Cervical back: Normal range of motion and neck supple.     Neurological: She is alert and oriented to person, place, and time. She has normal strength.   Moving all extremities.   Skin: Skin is warm and dry. No erythema. No pallor.   Psychiatric: She has a normal mood and affect.         ED Course   Procedures  Labs Reviewed   CBC W/ AUTO DIFFERENTIAL - Abnormal       Result Value     WBC 8.01      RBC 4.12      Hemoglobin 10.2 (*)     Hematocrit 34.1 (*)     MCV 83      MCH 24.8 (*)     MCHC 29.9 (*)     RDW 20.9 (*)     Platelets 327      MPV 9.5      Immature Granulocytes 0.1      Gran # (ANC) 4.8      Immature Grans (Abs) 0.01      Lymph # 2.2      Mono # 0.7      Eos # 0.2      Baso # 0.05      nRBC 0      Gran % 60.3      Lymph % 27.1      Mono % 9.0      Eosinophil % 2.9      Basophil % 0.6      Differential Method Automated     COMPREHENSIVE METABOLIC PANEL    Sodium 142      Potassium 3.7      Chloride 109      CO2 23      Glucose 81      BUN 13      Creatinine 0.8      Calcium 9.0      Total Protein 7.1      Albumin 3.6      Total Bilirubin 0.2      Alkaline Phosphatase 65      AST 20      ALT 17      eGFR >60      Anion Gap 10     MAGNESIUM    Magnesium 1.8     PROTIME-INR    Prothrombin Time 10.9      INR 1.0     APTT    aPTT 28.4     TSH   TSH    TSH 1.083     POCT URINE PREGNANCY    POC Preg Test, Ur Negative       Acceptable Yes     TYPE & SCREEN    Group & Rh B POS      Indirect Suzanne NEG      Specimen Outdate 09/04/2024 23:59     POCT GLUCOSE    POCT Glucose 97       EKG Readings: (Independently Interpreted)   EKG 22:30: NSR, HR 66. Normal axis. No ectopy. No STEMI.        Imaging Results              X-Ray Chest AP Portable (Final result)  Result time 09/01/24 22:51:51      Final result by Brianda Cortez MD (09/01/24 22:51:51)                   Impression:      No acute intrathoracic abnormality detected.      Electronically signed by: Brianda Cortez  Date:    09/01/2024  Time:    22:51               Narrative:    EXAMINATION:  AP PORTABLE CHEST    CLINICAL HISTORY:  Other fatigue    TECHNIQUE:  AP portable chest radiograph was submitted.    COMPARISON:  11/01/2023    FINDINGS:  AP portable chest radiograph demonstrates a cardiac silhouette within normal limits.  There is no focal consolidation, pneumothorax, or pleural effusion.                                        CT Head Without Contrast (Final result)  Result time 09/01/24 22:44:05      Final result by Johnny Esparza MD (09/01/24 22:44:05)                   Impression:      No acute intracranial abnormalities.      Electronically signed by: Johnny Esparza MD  Date:    09/01/2024  Time:    22:44               Narrative:    EXAMINATION:  CT HEAD WITHOUT CONTRAST    CLINICAL HISTORY:  Headache, sudden, severe;    TECHNIQUE:  Low dose axial images were obtained through the head.  Coronal and sagittal reformations were also performed. Contrast was not administered.    COMPARISON:  11/01/2023.    FINDINGS:  The brain parenchyma appears normal for age with good corticomedullary differentiation.  There is no evidence of acute infarct, hemorrhage, or mass.  The ventricular system is normal in size.  No mass-effect or midline shift.  There are no abnormal extra-axial fluid collections.  The paranasal sinuses and mastoid air cells are clear.  The calvarium appears intact.  .                                       Medications   acetaminophen tablet 1,000 mg (1,000 mg Oral Given 9/1/24 2225)     Medical Decision Making  44-year-old female with 3 weeks of headaches with photo and photophobia, bilateral arm numbness, fatigue, feeling cold, and having to chew gum to keep herself awake.    Differential includes anemia, acute kidney injury or renal failure, electrolyte derangement, occult malignancy, occult infection, other.    UPT negative.    EKG no STEMI, no dysrhythmia.     CXR NAD.    Labs: Hgb 10.2 -- anemic but unlikely etiology of symptoms.  CMP, mag, TSH reassuring.      CT head without acute process.    Patient declined meds in the ER.  I discussed supportive care with the patient.  Return precautions discussed.      Amount and/or Complexity of Data Reviewed  Labs: ordered.  Radiology: ordered.    Risk  OTC drugs.                                      Clinical Impression:  Final diagnoses:  [R53.83] Fatigue  (Primary)  [R51.9] Acute nonintractable headache, unspecified headache type  [F40.298] Phonophobia  [H53.149] Photophobia          ED Disposition Condition    Discharge Stable          ED Prescriptions    None       Follow-up Information       Follow up With Specialties Details Why Contact Info    Federico Pina MD Internal Medicine, Pediatrics  As needed [address] [place] [address]  [facility] [address]  [phone]               Hermelinda Rodriguez MD  09/02/24 1038

## 2024-09-03 LAB
OHS QRS DURATION: 100 MS
OHS QTC CALCULATION: 417 MS

## 2024-10-07 ENCOUNTER — HOSPITAL ENCOUNTER (OUTPATIENT)
Dept: RADIOLOGY | Facility: HOSPITAL | Age: 44
Discharge: HOME | End: 2024-10-07
Attending: INTERNAL MEDICINE
Payer: COMMERCIAL

## 2024-10-07 VITALS — WEIGHT: 263 LBS | HEIGHT: 67 IN | BODY MASS INDEX: 41.28 KG/M2

## 2024-10-07 DIAGNOSIS — Z12.31 ENCOUNTER FOR SCREENING MAMMOGRAM FOR MALIGNANT NEOPLASM OF BREAST: ICD-10-CM

## 2024-10-07 PROCEDURE — 77067 SCR MAMMO BI INCL CAD: CPT | Mod: TC

## 2025-03-02 ENCOUNTER — HOSPITAL ENCOUNTER (EMERGENCY)
Facility: HOSPITAL | Age: 45
Discharge: HOME | End: 2025-03-02
Attending: EMERGENCY MEDICINE
Payer: COMMERCIAL

## 2025-03-02 VITALS
RESPIRATION RATE: 7 BRPM | OXYGEN SATURATION: 98 % | BODY MASS INDEX: 39.24 KG/M2 | HEIGHT: 67 IN | WEIGHT: 250 LBS | DIASTOLIC BLOOD PRESSURE: 58 MMHG | SYSTOLIC BLOOD PRESSURE: 122 MMHG | TEMPERATURE: 99 F | HEART RATE: 75 BPM

## 2025-03-02 DIAGNOSIS — R53.83 FATIGUE: ICD-10-CM

## 2025-03-02 DIAGNOSIS — M62.838 MUSCLE SPASM: Primary | ICD-10-CM

## 2025-03-02 DIAGNOSIS — E86.0 DEHYDRATION: ICD-10-CM

## 2025-03-02 LAB
ABO + RH BLD: NORMAL
ALBUMIN SERPL BCP-MCNC: 3.4 G/DL (ref 3.5–5.2)
ALP SERPL-CCNC: 75 U/L (ref 40–150)
ALT SERPL W/O P-5'-P-CCNC: 19 U/L (ref 10–44)
ANION GAP SERPL CALC-SCNC: 7 MMOL/L (ref 8–16)
APTT PPP: 25.6 SEC (ref 21–32)
AST SERPL-CCNC: 16 U/L (ref 10–40)
BASOPHILS # BLD AUTO: 0.03 K/UL (ref 0–0.2)
BASOPHILS NFR BLD: 0.3 % (ref 0–1.9)
BILIRUB SERPL-MCNC: 0.2 MG/DL (ref 0.1–1)
BLD GP AB SCN CELLS X3 SERPL QL: NORMAL
BUN SERPL-MCNC: 17 MG/DL (ref 6–20)
CALCIUM SERPL-MCNC: 8.4 MG/DL (ref 8.7–10.5)
CHLORIDE SERPL-SCNC: 111 MMOL/L (ref 95–110)
CK SERPL-CCNC: 262 U/L (ref 20–180)
CO2 SERPL-SCNC: 24 MMOL/L (ref 23–29)
CREAT SERPL-MCNC: 0.8 MG/DL (ref 0.5–1.4)
DIFFERENTIAL METHOD BLD: ABNORMAL
EOSINOPHIL # BLD AUTO: 0.1 K/UL (ref 0–0.5)
EOSINOPHIL NFR BLD: 1.2 % (ref 0–8)
ERYTHROCYTE [DISTWIDTH] IN BLOOD BY AUTOMATED COUNT: 21.6 % (ref 11.5–14.5)
EST. GFR  (NO RACE VARIABLE): >60 ML/MIN/1.73 M^2
GLUCOSE SERPL-MCNC: 88 MG/DL (ref 70–110)
HCT VFR BLD AUTO: 28.5 % (ref 37–48.5)
HGB BLD-MCNC: 8.3 G/DL (ref 12–16)
IMM GRANULOCYTES # BLD AUTO: 0.03 K/UL (ref 0–0.04)
IMM GRANULOCYTES NFR BLD AUTO: 0.3 % (ref 0–0.5)
INR PPP: 1 (ref 0.8–1.2)
LYMPHOCYTES # BLD AUTO: 1.9 K/UL (ref 1–4.8)
LYMPHOCYTES NFR BLD: 18.5 % (ref 18–48)
MCH RBC QN AUTO: 22.3 PG (ref 27–31)
MCHC RBC AUTO-ENTMCNC: 29.1 G/DL (ref 32–36)
MCV RBC AUTO: 76 FL (ref 82–98)
MONOCYTES # BLD AUTO: 0.6 K/UL (ref 0.3–1)
MONOCYTES NFR BLD: 5.8 % (ref 4–15)
NEUTROPHILS # BLD AUTO: 7.6 K/UL (ref 1.8–7.7)
NEUTROPHILS NFR BLD: 73.9 % (ref 38–73)
NRBC BLD-RTO: 0 /100 WBC
PLATELET # BLD AUTO: 295 K/UL (ref 150–450)
PMV BLD AUTO: 8.7 FL (ref 9.2–12.9)
POCT GLUCOSE: 80 MG/DL (ref 70–110)
POTASSIUM SERPL-SCNC: 4 MMOL/L (ref 3.5–5.1)
PROT SERPL-MCNC: 7.3 G/DL (ref 6–8.4)
PROTHROMBIN TIME: 11.2 SEC (ref 9–12.5)
RBC # BLD AUTO: 3.73 M/UL (ref 4–5.4)
SODIUM SERPL-SCNC: 142 MMOL/L (ref 136–145)
SPECIMEN OUTDATE: NORMAL
WBC # BLD AUTO: 10.3 K/UL (ref 3.9–12.7)

## 2025-03-02 PROCEDURE — 86850 RBC ANTIBODY SCREEN: CPT | Performed by: EMERGENCY MEDICINE

## 2025-03-02 PROCEDURE — 82550 ASSAY OF CK (CPK): CPT | Performed by: EMERGENCY MEDICINE

## 2025-03-02 PROCEDURE — 93010 ELECTROCARDIOGRAM REPORT: CPT | Mod: ,,, | Performed by: INTERNAL MEDICINE

## 2025-03-02 PROCEDURE — 93005 ELECTROCARDIOGRAM TRACING: CPT

## 2025-03-02 PROCEDURE — 80053 COMPREHEN METABOLIC PANEL: CPT | Performed by: EMERGENCY MEDICINE

## 2025-03-02 PROCEDURE — 85610 PROTHROMBIN TIME: CPT | Performed by: EMERGENCY MEDICINE

## 2025-03-02 PROCEDURE — 99284 EMERGENCY DEPT VISIT MOD MDM: CPT | Mod: 25

## 2025-03-02 PROCEDURE — 82962 GLUCOSE BLOOD TEST: CPT

## 2025-03-02 PROCEDURE — 85730 THROMBOPLASTIN TIME PARTIAL: CPT | Performed by: EMERGENCY MEDICINE

## 2025-03-02 PROCEDURE — 85025 COMPLETE CBC W/AUTO DIFF WBC: CPT | Performed by: EMERGENCY MEDICINE

## 2025-03-02 RX ORDER — ACETAMINOPHEN 500 MG
500 TABLET ORAL EVERY 6 HOURS PRN
Qty: 13 TABLET | Refills: 0 | Status: SHIPPED | OUTPATIENT
Start: 2025-03-02

## 2025-03-02 RX ORDER — CYCLOBENZAPRINE HCL 10 MG
10 TABLET ORAL 3 TIMES DAILY PRN
Qty: 15 TABLET | Refills: 0 | Status: SHIPPED | OUTPATIENT
Start: 2025-03-02 | End: 2025-03-07

## 2025-03-02 NOTE — Clinical Note
"Rob Gonsalezmagalis Hernández was seen and treated in our emergency department on 3/2/2025.  She may return to work on 03/05/2025.       If you have any questions or concerns, please don't hesitate to call.      Isaiha Arredondo MD"

## 2025-03-02 NOTE — Clinical Note
"Rob Gonsalezmagalis Hernández was seen and treated in our emergency department on 3/2/2025.  She may return to work on 03/05/2025.       If you have any questions or concerns, please don't hesitate to call.      Isaiah Arredondo MD"

## 2025-03-03 NOTE — ED PROVIDER NOTES
"Encounter Date: 3/2/2025    SCRIBE #1 NOTE: I, Saray Quispe, am scribing for, and in the presence of,  Isaiah Arredondo MD. I have scribed the following portions of the note - the EKG reading. Other sections scribed: HPI, ROS, PE.       History     Chief Complaint   Patient presents with    Fatigue     Pt reports increased fatigue, body cramps, sob with exertion x 2 weeks.  Hx of anemia and blood transfusions.  Reports she feels that her hemoglobin is low.     Rob Hernández is a 44 y.o. female, with a PMHx of a blood transfusion, who presents to the ED with fatigue onset 2-3 weeks ago. Patient also reports exertional SOB, headaches, cramping to her extremities, abdominal "tightness" and bloating. Treatment with OTC magnesium supplements and a TENS unit for her cramping have not provided any significant relief.  The patient states she put off coming to the ED due to responsibilities in her life such as her job. Patient reports a past history of similar symptoms when she was diagnosed with low hemoglobin requiring a blood transfusion. She reports having a uterine tumor ablation performed in December 2024, with a normal follow up appointment in the proceeding weeks. LMP occurred approximately 2 weeks ago. Patient denies any recent exercising, strenuous activity, falls, trips, trauma, or lifestyle changes. No other exacerbating or alleviating factors. Denies fevers, vaginal bleeding, or other associated symptoms.       The history is provided by the patient. No  was used.     Review of patient's allergies indicates:  No Known Allergies  Past Medical History:   Diagnosis Date    Torn ligament     left shoulder    Torn meniscus      Past Surgical History:   Procedure Laterality Date    INNER EAR SURGERY      INNER EAR SURGERY      left knee surg      right shoulder surg      TUBAL LIGATION      VAGINAL DELIVERY      x3     Family History   Problem Relation Name Age of Onset    Hypertension " Mother      Breast cancer Paternal Aunt       Social History[1]  Review of Systems   Constitutional:  Positive for fatigue.   HENT: Negative.     Eyes: Negative.    Respiratory:  Positive for shortness of breath.    Cardiovascular: Negative.    Gastrointestinal:         + abdominal tightness. +abdominal bloating.   Endocrine: Negative.    Genitourinary: Negative.    Musculoskeletal:         + cramping to all four extremities.    Skin: Negative.    Allergic/Immunologic: Negative.    Neurological:  Positive for headaches.   Hematological: Negative.    Psychiatric/Behavioral: Negative.     All other systems reviewed and are negative.      Physical Exam     Initial Vitals [03/02/25 1549]   BP Pulse Resp Temp SpO2   (!) 147/68 97 20 98.7 °F (37.1 °C) 97 %      MAP       --         Physical Exam    Nursing note and vitals reviewed.  Constitutional: She appears well-developed and well-nourished.   HENT:   Head: Normocephalic and atraumatic. Mouth/Throat: Oropharynx is clear and moist. Mucous membranes are dry.   Eyes: Conjunctivae and EOM are normal. Pupils are equal, round, and reactive to light. Right conjunctiva is not injected. Left conjunctiva is not injected. No scleral icterus.   Neck: Neck supple.   Normal range of motion.   Full passive range of motion without pain.     Cardiovascular:  Normal rate, regular rhythm, S1 normal, S2 normal, normal heart sounds and normal pulses.     Exam reveals no gallop and no friction rub.       No murmur heard.  Pulses:       Radial pulses are 2+ on the right side and 2+ on the left side.   Pulmonary/Chest: Effort normal and breath sounds normal. No respiratory distress.   Abdominal: Abdomen is soft. She exhibits no distension. There is no abdominal tenderness.   Musculoskeletal:         General: No edema. Normal range of motion.      Cervical back: Full passive range of motion without pain, normal range of motion and neck supple.      Comments: Good active ROM of all extremities.  No lower extremity edema or cyanosis.      Neurological: No cranial nerve deficit. Gait normal.   A&Ox4, normal speech.   Skin: Skin is warm. Capillary refill takes 2 to 3 seconds. No ecchymosis and no rash noted.   Psychiatric: She has a normal mood and affect. Thought content normal.         ED Course   Procedures  Labs Reviewed   COMPREHENSIVE METABOLIC PANEL - Abnormal       Result Value    Sodium 142      Potassium 4.0      Chloride 111 (*)     CO2 24      Glucose 88      BUN 17      Creatinine 0.8      Calcium 8.4 (*)     Total Protein 7.3      Albumin 3.4 (*)     Total Bilirubin 0.2      Alkaline Phosphatase 75      AST 16      ALT 19      eGFR >60      Anion Gap 7 (*)    CBC W/ AUTO DIFFERENTIAL - Abnormal    WBC 10.30      RBC 3.73 (*)     Hemoglobin 8.3 (*)     Hematocrit 28.5 (*)     MCV 76 (*)     MCH 22.3 (*)     MCHC 29.1 (*)     RDW 21.6 (*)     Platelets 295      MPV 8.7 (*)     Immature Granulocytes 0.3      Gran # (ANC) 7.6      Immature Grans (Abs) 0.03      Lymph # 1.9      Mono # 0.6      Eos # 0.1      Baso # 0.03      nRBC 0      Gran % 73.9 (*)     Lymph % 18.5      Mono % 5.8      Eosinophil % 1.2      Basophil % 0.3      Differential Method Automated     CK - Abnormal     (*)    APTT    aPTT 25.6     PROTIME-INR    Prothrombin Time 11.2      INR 1.0     TYPE & SCREEN    Group & Rh B POS      Indirect Suzanne NEG      Specimen Outdate 03/05/2025 23:59     POCT GLUCOSE    POCT Glucose 80       EKG Readings: (Independently Interpreted)   Previous EKG: Compared with most recent EKG Previous EKG Date: 09/01/2024. Rhythm: Normal Sinus Rhythm. Ectopy: No Ectopy. Conduction: Normal. ST Segments: Normal ST Segments. T Waves: Normal. Clinical Impression: Normal Sinus Rhythm       Imaging Results    None          Medications - No data to display  Medical Decision Making  Differential diagnosis includes but not slow to dehydration, since of anemia, infection, electrolyte abnormality, muscle  spasms    44-year-old female presenting secondary fatigue muscle spasms.  Labs reassuring.  Mildly elevated CPK.  Patient's hemoglobin reassuring.  Vitals reassuring.  Will discharge with medications below and outpatient follow-up.  Exam reassuring. I discussed with the patient/family the diagnosis, treatment plan, indications for return to the emergency department, and for expected follow-up. The patient/family verbalized an understanding. The patient/family is asked if there are any questions or concerns. We discuss the case, until all issues are addressed to the patient/family's satisfaction. Patient/family understands and is agreeable to the plan.   Isaiah Arredondo    DISCLAIMER: This note was prepared with [employer] voice recognition transcription software. Garbled syntax, mangled pronouns, and other bizarre constructions may be attributed to that software system.        Amount and/or Complexity of Data Reviewed  Labs: ordered. Decision-making details documented in ED Course.  ECG/medicine tests: ordered and independent interpretation performed. Decision-making details documented in ED Course.    Risk  OTC drugs.  Prescription drug management.            Scribe Attestation:   Scribe #1: I performed the above scribed service and the documentation accurately describes the services I performed. I attest to the accuracy of the note.                               Clinical Impression:  Final diagnoses:  [R53.83] Fatigue  [M62.838] Muscle spasm (Primary)  [E86.0] Dehydration          ED Disposition Condition    Discharge Stable          ED Prescriptions       Medication Sig Dispense Start Date End Date Auth. Provider    acetaminophen (TYLENOL) 500 MG tablet Take 1 tablet (500 mg total) by mouth every 6 (six) hours as needed. 13 tablet 3/2/2025 -- Isaiah Arredondo MD    cyclobenzaprine (FLEXERIL) 10 MG tablet Take 1 tablet (10 mg total) by mouth 3 (three) times daily as needed. 15 tablet 3/2/2025 3/7/2025 Isaiah Arredondo,  MD          Follow-up Information       Follow up With Specialties Details Why Contact Info    Federico Pina MD Internal Medicine, Pediatrics Schedule an appointment as soon as possible for a visit in 2 days  [address] [place] [address]  [facility] [place] [address]  [phone]            I, isaiah arredondo, personally performed the services described in this documentation. All medical record entries made by the scribe were at my direction and in my presence. I have reviewed the chart and agree that the record reflects my personal performance and is accurate and complete.      DISCLAIMER: This note was prepared with [employer] voice recognition transcription software. Garbled syntax, mangled pronouns, and other bizarre constructions may be attributed to that software system.         [1]   Social History  Tobacco Use    Smoking status: Never    Smokeless tobacco: Never   Substance Use Topics    Alcohol use: Yes     Comment: occ    Drug use: Yes     Types: Marijuana        Isaiah Arredondo MD  03/03/25 0048

## 2025-03-03 NOTE — DISCHARGE INSTRUCTIONS

## 2025-03-03 NOTE — ED TRIAGE NOTES
"Pt presented to the ED with increased fatigue , body cramps, and shortness of breath with exertion x 2 weeks. Pt reports hx of anemia and blood transfusions. Pt reports "I have to keep my shoes off because of the spasms". Pt reports "I think my hemoglobin is low". Pt denies chest pain. Pt AA&Ox4.   "

## 2025-03-04 LAB
OHS QRS DURATION: 94 MS
OHS QTC CALCULATION: 425 MS

## 2025-03-10 DIAGNOSIS — R10.30 LOWER ABDOMINAL PAIN, UNSPECIFIED: Primary | ICD-10-CM

## 2025-03-19 ENCOUNTER — HOSPITAL ENCOUNTER (OUTPATIENT)
Dept: RADIOLOGY | Facility: HOSPITAL | Age: 45
Discharge: HOME | End: 2025-03-19
Attending: INTERNAL MEDICINE
Payer: COMMERCIAL

## 2025-03-19 DIAGNOSIS — M54.16 RADICULOPATHY, LUMBAR REGION: ICD-10-CM

## 2025-03-19 DIAGNOSIS — R10.30 LOWER ABDOMINAL PAIN, UNSPECIFIED: ICD-10-CM

## 2025-03-19 PROCEDURE — 72100 X-RAY EXAM L-S SPINE 2/3 VWS: CPT | Mod: TC,FY

## 2025-03-19 PROCEDURE — 72100 X-RAY EXAM L-S SPINE 2/3 VWS: CPT | Mod: 26,,, | Performed by: RADIOLOGY
